# Patient Record
Sex: FEMALE | Race: WHITE | NOT HISPANIC OR LATINO | Employment: FULL TIME | URBAN - METROPOLITAN AREA
[De-identification: names, ages, dates, MRNs, and addresses within clinical notes are randomized per-mention and may not be internally consistent; named-entity substitution may affect disease eponyms.]

---

## 2017-04-11 ENCOUNTER — TRANSCRIBE ORDERS (OUTPATIENT)
Dept: ADMINISTRATIVE | Facility: HOSPITAL | Age: 57
End: 2017-04-11

## 2017-04-11 DIAGNOSIS — Z12.31 VISIT FOR SCREENING MAMMOGRAM: Primary | ICD-10-CM

## 2017-05-10 ENCOUNTER — HOSPITAL ENCOUNTER (OUTPATIENT)
Dept: MAMMOGRAPHY | Facility: HOSPITAL | Age: 57
Discharge: HOME | End: 2017-05-10
Attending: INTERNAL MEDICINE | Admitting: INTERNAL MEDICINE

## 2017-05-10 DIAGNOSIS — Z12.31 VISIT FOR SCREENING MAMMOGRAM: ICD-10-CM

## 2017-05-10 PROCEDURE — G0202 SCR MAMMO BI INCL CAD: HCPCS

## 2018-02-28 ENCOUNTER — TRANSCRIBE ORDERS (OUTPATIENT)
Dept: ADMINISTRATIVE | Facility: HOSPITAL | Age: 58
End: 2018-02-28

## 2018-02-28 DIAGNOSIS — Z12.31 VISIT FOR SCREENING MAMMOGRAM: Primary | ICD-10-CM

## 2018-03-13 ENCOUNTER — TRANSCRIBE ORDERS (OUTPATIENT)
Dept: ADMINISTRATIVE | Facility: HOSPITAL | Age: 58
End: 2018-03-13

## 2018-03-13 ENCOUNTER — TELEPHONE (OUTPATIENT)
Dept: GASTROENTEROLOGY | Facility: CLINIC | Age: 58
End: 2018-03-13

## 2018-03-13 DIAGNOSIS — Z78.0 POSTMENOPAUSAL: Primary | ICD-10-CM

## 2018-03-13 NOTE — TELEPHONE ENCOUNTER
----- Message from Darnell Westbrook sent at 3/13/2018  4:10 PM EDT -----  Regarding: PT CALLING   Contact: [phone]  PT CALLING ASKING WHEN SHE SHOULD SHE HAVE HER NEXT C/S ?

## 2018-03-13 NOTE — TELEPHONE ENCOUNTER
See note of 3/18/16 - repeat c/s in 5 yrs.      Call to pt to advise of above.  Verb understanding.

## 2018-05-09 ENCOUNTER — OFFICE VISIT (OUTPATIENT)
Dept: NEUROSURGERY | Facility: CLINIC | Age: 58
End: 2018-05-09

## 2018-05-09 VITALS
HEART RATE: 88 BPM | RESPIRATION RATE: 16 BRPM | HEIGHT: 66 IN | WEIGHT: 127 LBS | BODY MASS INDEX: 20.41 KG/M2 | SYSTOLIC BLOOD PRESSURE: 110 MMHG | DIASTOLIC BLOOD PRESSURE: 70 MMHG

## 2018-05-09 DIAGNOSIS — G89.29 CHRONIC SCAPULAR PAIN: ICD-10-CM

## 2018-05-09 DIAGNOSIS — G56.82 SUPRASCAPULAR ENTRAPMENT NEUROPATHY OF LEFT SIDE: ICD-10-CM

## 2018-05-09 DIAGNOSIS — M89.8X1 CHRONIC SCAPULAR PAIN: ICD-10-CM

## 2018-05-09 DIAGNOSIS — M54.2 NECK PAIN ON LEFT SIDE: Primary | ICD-10-CM

## 2018-05-09 PROCEDURE — 99214 OFFICE O/P EST MOD 30 MIN: CPT | Performed by: NURSE PRACTITIONER

## 2018-05-09 RX ORDER — CETIRIZINE HYDROCHLORIDE 10 MG/1
10 TABLET ORAL DAILY
COMMUNITY

## 2018-05-09 RX ORDER — ACETAMINOPHEN 500 MG
1000 TABLET ORAL AS NEEDED
COMMUNITY

## 2018-05-09 RX ORDER — CYCLOBENZAPRINE HCL 10 MG
10 TABLET ORAL 2 TIMES DAILY PRN
COMMUNITY
Start: 2018-04-17

## 2018-05-09 RX ORDER — PROMETHAZINE HYDROCHLORIDE 25 MG/1
25 TABLET ORAL AS NEEDED
COMMUNITY
End: 2018-09-11 | Stop reason: ALTCHOICE

## 2018-05-09 RX ORDER — HYDROCODONE BITARTRATE AND ACETAMINOPHEN 7.5; 325 MG/1; MG/1
1 TABLET ORAL EVERY 4 HOURS PRN
COMMUNITY

## 2018-05-09 NOTE — PROGRESS NOTES
Subjective   Patient ID: Terri Grant is a 58 y.o. female is here today for follow-up of ongoing suprascapular pain. She has been treating with pain management without relief and has not undergone additional imaging. She is unaccompanied.    History of Present Illness  Patient with history of left suprascapular and neck pain.  She was last seen in 2016 following a left suprascapular nerve injection.  At that time she had significant improvement in her symptoms.  She had an EMG that by report and Dr. Mcdonough office visit note from March 22, 2016 did not show significant difference in reduction in the suprascapular nerve bilaterally and no active denervation.  A second nerve block was recommended but the patient states that her the pain management providers did not feel it was necessary at that point.  He she did well until about 6 months ago when she started having intermittent flareups of pain in the left neck and suprascapular region.  It became significantly worse about 6 weeks ago when she had a severe flareup the pain is sharp stabbing begins in the left lateral neck and radiates to the top of the medial left scapular region.  She has been through physical therapy, acupuncture, pain patches, gabapentin, Lyrica in the past.  She had side effects with medications.  She finds that the only thing that gives her benefit is pressure on the area.  Her pain was severe to the point that she was using narcotic medications, but she has started using oral him to extract this found significant improvement with that.  She is no longer using narcotics.  She denies any associated weakness, numbness, tingling, pain in the arms.  Movement of her neck aggravates the scapular pain as well as the neck pain.    The following portions of the patient's history were reviewed and updated as appropriate: allergies, current medications and problem list.    Review of Systems   Musculoskeletal: Positive for neck pain (L) suprascapular  pain. Negative for gait problem.   Neurological: Positive for weakness (left arm) and numbness (if sleeping on (L) side at night).       Objective   Physical Exam   Constitutional: She is oriented to person, place, and time. She appears well-developed and well-nourished.   Pulmonary/Chest: Effort normal.   Musculoskeletal:        Cervical back: She exhibits tenderness (left upper trapezius, left neck) and pain (into neck and left scapula with neck ROM). She exhibits normal range of motion.        Thoracic back: She exhibits tenderness (left suprascapular region). She exhibits no deformity (no scapular winging).   Neurological: She is alert and oriented to person, place, and time. She has normal strength. Gait normal.   Reflex Scores:       Tricep reflexes are 2+ on the right side and 2+ on the left side.       Bicep reflexes are 2+ on the right side and 2+ on the left side.       Brachioradialis reflexes are 2+ on the right side and 2+ on the left side.       Patellar reflexes are 2+ on the right side and 2+ on the left side.       Achilles reflexes are 2+ on the right side and 2+ on the left side.  Skin: Skin is warm and dry.   Psychiatric: Her speech is normal. Judgment normal. Her mood appears anxious. She is hyperactive. Cognition and memory are normal.   Pain responses She is attentive.   Vitals reviewed.    Neurologic Exam     Mental Status   Oriented to person, place, and time.   Speech: speech is normal   Level of consciousness: alert  Knowledge: good.     Motor Exam   Muscle bulk: normal  Right arm tone: normal  Left arm tone: normal    Strength   Strength 5/5 throughout.     Sensory Exam   Right arm light touch: normal  Left arm light touch: normal  Right arm vibration: normal  Left arm vibration: normal  Temperature intact to cold     Gait, Coordination, and Reflexes     Gait  Gait: normal    Reflexes   Right brachioradialis: 2+  Left brachioradialis: 2+  Right biceps: 2+  Left biceps: 2+  Right triceps:  2+  Left triceps: 2+  Right patellar: 2+  Left patellar: 2+  Right achilles: 2+  Left achilles: 2+  Right Verduzco: absent  Left Verduzco: absent      Assessment/Plan   Independent Review of Radiographic Studies:    No new imaging    Medical Decision Making:    Patient presents for return of left suprascapular pain.  She had great resolution with a left super scapular nerve injection approximately 2-1/2 years ago.  Her pain began again about 6 months ago and has become progressively worse particularly in the last 6 weeks.  She denies any radicular features in the arms.  She has significant trouble getting comfortable.  She states that if she could keep pressure on the area at all time she would be fine, but obviously that is impossible.    Her exam is as noted above with no neurologic red flags.  She is exquisitely tender in the left suprascapular region but also states that the pain improves with the pressure that I place there.  She has normal strength and sensation and reflexes in her upper extremities.  Her neck range of motion may be slightly reduced but it does increase her neck and scapular pain.     She will likely benefit from a left suprascapular nerve injection, but due to the resolution of her pain and recurrence, I recommend that we reimage first to ensure she doesn't have any new spine abnormalities.  I would like to also look at her thoracic spine to ensure that we didn't overlook something previously that might be present.  At her next visit, we will consider sending her for a left suprascapular nerve injection.  If she gets good relief with that, she may benefit from surgical neuro lysis of the left suprascapular nerve.    Plan: MRI cervical and thoracic spine and return to office following.  Terri was seen today for other.    Diagnoses and all orders for this visit:    Neck pain on left side  -     MRI Cervical Spine Without Contrast; Future    Chronic scapular pain  -     MRI Cervical Spine  Without Contrast; Future  -     MRI Thoracic Spine Without Contrast; Future    Suprascapular entrapment neuropathy of left side      Return for with imaging, Follow-up with NP.

## 2018-05-11 ENCOUNTER — HOSPITAL ENCOUNTER (OUTPATIENT)
Dept: MAMMOGRAPHY | Facility: HOSPITAL | Age: 58
Discharge: HOME | End: 2018-05-11
Attending: INTERNAL MEDICINE | Admitting: INTERNAL MEDICINE

## 2018-05-11 ENCOUNTER — HOSPITAL ENCOUNTER (OUTPATIENT)
Dept: BONE DENSITY | Facility: HOSPITAL | Age: 58
Discharge: HOME | End: 2018-05-11
Attending: INTERNAL MEDICINE

## 2018-05-11 DIAGNOSIS — Z12.31 VISIT FOR SCREENING MAMMOGRAM: ICD-10-CM

## 2018-05-11 DIAGNOSIS — Z78.0 POSTMENOPAUSAL: ICD-10-CM

## 2018-05-11 PROCEDURE — 77067 SCR MAMMO BI INCL CAD: CPT

## 2018-05-11 PROCEDURE — 77080 DXA BONE DENSITY AXIAL: CPT

## 2018-05-15 ENCOUNTER — TELEPHONE (OUTPATIENT)
Dept: NEUROSURGERY | Facility: CLINIC | Age: 58
End: 2018-05-15

## 2018-05-15 NOTE — TELEPHONE ENCOUNTER
Patient last seen on 5/29 by  for chronic scapular pain. Denise sappin for patient to have 2 MRIs.    Patient wants Denise to know that she cannot afford to have her MRIs done at [facility]. She is going to call [facility] and see how much they cost there. She said that she cannot afford to do 2 MRIs and doesn't know why she needs 2. She will call us back and let us know if she is going to do her MRIs or if I need to cancel her appt in June with Maday.    Please advise

## 2018-05-15 NOTE — TELEPHONE ENCOUNTER
Discussed with Dr. Mcdonough. He said we can try ordering cervical MRI to include through [facility] as that will show all that we need but I'm not sure if the imaging center will agree to that. Sometimes they will make us order both.

## 2018-05-16 NOTE — TELEPHONE ENCOUNTER
Pt called to request orders faxed to [facility] so they will schedule her.  I contacted [facility] and transferred the authorization to that facility and sent to [facility] as well.  Pt will need to cancel her appointments with [facility] once [facility] schedules her and let us know when her appt with O is.

## 2018-05-22 NOTE — TELEPHONE ENCOUNTER
Called [facility] and they said that they have called her and left a message for her to call back and  Get scheduled

## 2018-06-27 ENCOUNTER — OFFICE VISIT (OUTPATIENT)
Dept: NEUROSURGERY | Facility: CLINIC | Age: 58
End: 2018-06-27

## 2018-06-27 VITALS
HEIGHT: 66 IN | SYSTOLIC BLOOD PRESSURE: 108 MMHG | RESPIRATION RATE: 18 BRPM | WEIGHT: 127 LBS | BODY MASS INDEX: 20.41 KG/M2 | HEART RATE: 79 BPM | DIASTOLIC BLOOD PRESSURE: 68 MMHG

## 2018-06-27 DIAGNOSIS — M54.2 NECK PAIN ON LEFT SIDE: Primary | ICD-10-CM

## 2018-06-27 DIAGNOSIS — G56.82 SUPRASCAPULAR ENTRAPMENT NEUROPATHY OF LEFT SIDE: ICD-10-CM

## 2018-06-27 PROCEDURE — 99213 OFFICE O/P EST LOW 20 MIN: CPT | Performed by: NURSE PRACTITIONER

## 2018-06-27 RX ORDER — IBUPROFEN 200 MG
200 TABLET ORAL EVERY 6 HOURS PRN
COMMUNITY
End: 2019-05-24

## 2018-06-27 NOTE — PROGRESS NOTES
"Subjective   Patient ID: Terri Grant is a 58 y.o. female is here today for follow-up scapular pain. Patient presents unaccompanied.     History of Present Illness     She returns to the office today for follow-up of suprascapular pain.  She's been treated at pain management without relief.  She did undergo prior left suprascapular nerve injection in 2016 and reported significant improvement in her symptoms.  She had a flareup about 6 weeks ago in the left neck and suprascapular area.  She has tried physical therapy, acupuncture pain patches and other medications which have provided no relief.  She has undergone thoracic and cervical MRI imaging for further evaluation.    Today, she reports constant, ongoing left suprascapular pain.  The only way she gets relief is with constant pressure of the left suprascapular nerve.  She uses a tennis ball when she is driving, a rolling ball massage or when she is at home and also gets her  to apply deep pressure at the specific area with his elbow.  She has a follow-up appointment with pain management on July 12 and is hopeful for another injection.  She is interested in more permanent lysis of the nerve and is hopeful for pain relief with the injection.  She denies any new problems.  She also has ongoing left-sided neck pain.  There is no radiation into the left arm.    She presents unaccompanied.      /68 (BP Location: Left arm, Patient Position: Sitting)   Pulse 79   Resp 18   Ht 166.4 cm (65.5\")   Wt 57.6 kg (127 lb)   LMP  (LMP Unknown)   BMI 20.81 kg/m²     The following portions of the patient's history were reviewed and updated as appropriate: allergies, current medications, past family history, past medical history, past social history, past surgical history and problem list.    Review of Systems   Genitourinary: Negative for difficulty urinating and enuresis.   Musculoskeletal: Positive for neck stiffness (L side). Negative for neck pain.     "    Scapular pain, denies arm pain   Neurological: Positive for numbness (occ'l L side if sleeps on it). Negative for weakness.   Psychiatric/Behavioral: Positive for sleep disturbance.       Objective   Physical Exam   Constitutional: She is oriented to person, place, and time. She appears well-developed and well-nourished. She is cooperative.   HENT:   Head: Atraumatic.   Neck: Neck supple. No tracheal deviation present.   Pulmonary/Chest: Effort normal and breath sounds normal.   Abdominal: Soft.   Musculoskeletal: Normal range of motion. She exhibits tenderness (very tender to pinpoint palpation at the left suprascapular notch.). She exhibits no deformity.   Neurological: She is alert and oriented to person, place, and time. She displays no tremor and normal reflexes. No cranial nerve deficit. Coordination and gait normal. GCS eye subscore is 4. GCS verbal subscore is 5. GCS motor subscore is 6.   Skin: Skin is warm and dry. No pallor.   Psychiatric: She has a normal mood and affect. Her behavior is normal.   Vitals reviewed.    Neurologic Exam     Mental Status   Oriented to person, place, and time.       Assessment/Plan   Independent Review of Radiographic Studies:    Thoracic MRI imaging reveals mild spondylolisthesis and multilevel small disc bulges, no acute abnormalities noted.  Cervical MRI imaging also shows stable findings with very small disc bulges and mild spondylosis.        Medical Decision Making:    She returns the office today for ongoing follow-up with history of left suprascapular nerve impingement and pain.  Exam as noted above, no red flags.  She continues with almost constant pain at this area and get relief from her discomfort by applying firm pressure to the area.  She is pending an additional injection with pain management and a couple weeks.  I will talk to Dr. Mcdonough about more permanent nerve lysis/ablation at this area in hopes of giving her more long lasting relief.  She will be  contacted with this information.    Plan: Injection as scheduled with pain management, patient will be contacted with Dr. Mcdonough recommendation.      Addendum: Discussed with Dr. Mcdonough and he states that he would be happy to see the patient for further discussion of possible left suprascapular nerve root entrapment.  Patient contacted, she declines to pursue any surgical treatment at this time.  We will see her back on an as-needed basis going forward.    Terri was seen today for scapular pain.    Diagnoses and all orders for this visit:    Neck pain on left side    Suprascapular entrapment neuropathy of left side      Return if symptoms worsen or fail to improve.

## 2018-07-06 ENCOUNTER — TELEPHONE (OUTPATIENT)
Dept: NEUROSURGERY | Facility: CLINIC | Age: 58
End: 2018-07-06

## 2018-07-06 NOTE — TELEPHONE ENCOUNTER
Patient last seen on 6/27 by Maday for scapular pain. Patient called and said that someone called her while she was at the hospital with her mother. She was not sure what it was about. Went over Maday's note and read where it said that Maday would talk to Dr Mcdonough about doing nerve lysis/ablation.  Pt said that she does not want to do an ablation.

## 2018-09-11 ENCOUNTER — OFFICE VISIT (OUTPATIENT)
Dept: NEUROSURGERY | Facility: CLINIC | Age: 58
End: 2018-09-11

## 2018-09-11 VITALS
WEIGHT: 121 LBS | BODY MASS INDEX: 19.83 KG/M2 | RESPIRATION RATE: 16 BRPM | HEART RATE: 88 BPM | SYSTOLIC BLOOD PRESSURE: 130 MMHG | DIASTOLIC BLOOD PRESSURE: 74 MMHG

## 2018-09-11 DIAGNOSIS — G56.82 SUPRASCAPULAR ENTRAPMENT NEUROPATHY OF LEFT SIDE: Primary | ICD-10-CM

## 2018-09-11 PROCEDURE — 99213 OFFICE O/P EST LOW 20 MIN: CPT | Performed by: NEUROLOGICAL SURGERY

## 2018-09-11 NOTE — PROGRESS NOTES
Subjective   Patient ID: Terri Grant is a 58 y.o. female is here today for follow-up of left scapular pain. She is unaccompanied.    History of Present Illness  Patient has history of intermittent left suprascapular pain. It is now constant and improved only with direct pressure. She had only 4 days of relief with left suprascapular pain following most recent injection 8/13/18. She has daily suprascapular pain. It interferes with ADLs. She has tried OTC patches, creams, CBD oil, massage (electronic and ball), flexeril, pain medication, PT, acupuncture, heat, and ice.    The following portions of the patient's history were reviewed and updated as appropriate: allergies, current medications and problem list.    Review of Systems   Musculoskeletal: Positive for arthralgias (left scapular region) and neck pain. Negative for back pain and gait problem.   Neurological: Negative for weakness.       Objective   Physical Exam   Constitutional: She is oriented to person, place, and time. She appears well-developed and well-nourished.   Pulmonary/Chest: Effort normal.   Musculoskeletal:        Cervical back: She exhibits tenderness (left upper trapezius, left neck) and pain (into neck and left scapula with neck ROM). She exhibits normal range of motion.        Thoracic back: She exhibits tenderness (left suprascapular region). She exhibits no deformity (no scapular winging).   Neurological: She is alert and oriented to person, place, and time. She has normal strength. Gait normal.   Reflex Scores:       Tricep reflexes are 2+ on the right side and 2+ on the left side.       Bicep reflexes are 2+ on the right side and 2+ on the left side.       Brachioradialis reflexes are 2+ on the right side and 2+ on the left side.  Skin: Skin is warm and dry.   Psychiatric: Her speech is normal and behavior is normal. Judgment normal. Her mood appears anxious (mildly). Cognition and memory are normal.   Pain responses She is  attentive.   Vitals reviewed.    Neurologic Exam     Mental Status   Oriented to person, place, and time.   Speech: speech is normal   Level of consciousness: alert  Knowledge: good.   Normal comprehension.     Motor Exam   Muscle bulk: normal    Strength   Strength 5/5 throughout.     Sensory Exam   Right arm light touch: normal  Left arm light touch: normal    Gait, Coordination, and Reflexes     Gait  Gait: normal    Reflexes   Right brachioradialis: 2+  Left brachioradialis: 2+  Right biceps: 2+  Left biceps: 2+  Right triceps: 2+  Left triceps: 2+  Right Verduzco: absent  Left Verduzco: absent      Assessment/Plan   Independent Review of Radiographic Studies:      MRI cervical personally reviewed : No change no appearance. No neural compression.    Medical Decision Making:      I confirmed and obtained the above history as recorded by the nurse practitioner acting as a scribe. I performed the above examination and it is documented by the nurse practitioner acting as a scribe.    Patient presents with symptoms consistent with suprascapular nerve compression on the left side.    Treatment options include continued conservative treatment or decompression of the suprascapular nerve.    I explained the risks benefits and alternatives of suprascapular nerve decompression which include paralysis of the arm, chronic pain in the area of the incision, damage to the suprascapular nerve that could end up in a winging scapula, lack of improvement.  I explained to the patient that the most common complication would be lack of improvement rather than these other much more rare and substantially worse complications.    I encouraged the patient to consider referral to a major Medical Center.    We will try to get her referred to a major Medical Center.    Terri was seen today for left scapular pain.    Diagnoses and all orders for this visit:    Suprascapular entrapment neuropathy of left side      Return if symptoms worsen  or fail to improve.

## 2018-10-03 ENCOUNTER — TELEPHONE (OUTPATIENT)
Dept: NEUROSURGERY | Facility: CLINIC | Age: 58
End: 2018-10-03

## 2018-10-03 NOTE — TELEPHONE ENCOUNTER
"Dr. Mcdonough was to investigate the name of MD at \"[facility]\" who treats scapular entrapment.     I have not yet received this information from him; will discuss again when I see him tomorrow. Patient updated.  "

## 2018-10-04 NOTE — TELEPHONE ENCOUNTER
Per MARIBELL: refer her to dept chair, Dr. Shaka Burkett at [facility] for consideration of scapular nerve decompression. ([phone])    Referral form along with last two office notes, old/new MRI cervical reports and insurance card has been faxed ([phone]).    Left detailed VM informing patient of this.

## 2018-10-05 NOTE — TELEPHONE ENCOUNTER
Patient informed. We received confirmation of receipt of records, [facility] # -[phone]. They have requested imaging be mailed to them.    Sent CD w/MRI cerv/thor 6-2-18, MRI cerv 4-9-14, 3-3-14  [address]  [address]  Attn: Neurosurgery Appt Office

## 2019-01-31 ENCOUNTER — HOSPITAL ENCOUNTER (OUTPATIENT)
Dept: ORTHOPEDIC SURGERY | Facility: CLINIC | Age: 59
Discharge: HOME | End: 2019-01-31
Attending: NEUROLOGICAL SURGERY | Admitting: NEUROLOGICAL SURGERY

## 2019-02-01 ENCOUNTER — TELEPHONE (OUTPATIENT)
Dept: NEUROSURGERY | Facility: CLINIC | Age: 59
End: 2019-02-01

## 2019-02-01 NOTE — TELEPHONE ENCOUNTER
Pt was lst seen on 9/11 with MARIBELL for left scapular pain.    Pt calls today wanting to inform [facility] that she has seen Dr. Ziegler and she understands that they have spoken but she may want to stay with him due to his location. Dr. Ziegler is wanting to order some more tests and the pt wants to know what he thinks of this.    [phone]

## 2019-02-01 NOTE — TELEPHONE ENCOUNTER
It is always a good thing to go with any testing that the treating physician recommends. She should do what Dr. Ziegler has recommended since he is now treating her scapular entrapment pain, not Dr. Mcdonough. M explaining this.

## 2019-02-16 ENCOUNTER — HOSPITAL ENCOUNTER (OUTPATIENT)
Dept: OTHER | Facility: HOSPITAL | Age: 59
Discharge: HOME | End: 2019-02-16
Attending: NEUROLOGICAL SURGERY | Admitting: NEUROLOGICAL SURGERY

## 2019-02-28 ENCOUNTER — HOSPITAL ENCOUNTER (OUTPATIENT)
Dept: ORTHOPEDIC SURGERY | Facility: CLINIC | Age: 59
Discharge: HOME | End: 2019-02-28
Attending: ORTHOPAEDIC SURGERY | Admitting: ORTHOPAEDIC SURGERY

## 2019-03-21 ENCOUNTER — TRANSCRIBE ORDERS (OUTPATIENT)
Dept: ADMINISTRATIVE | Facility: HOSPITAL | Age: 59
End: 2019-03-21

## 2019-03-21 DIAGNOSIS — Z12.31 VISIT FOR SCREENING MAMMOGRAM: Primary | ICD-10-CM

## 2019-05-24 ENCOUNTER — APPOINTMENT (OUTPATIENT)
Dept: PREADMISSION TESTING | Facility: HOSPITAL | Age: 59
End: 2019-05-24

## 2019-05-24 ENCOUNTER — HOSPITAL ENCOUNTER (OUTPATIENT)
Dept: GENERAL RADIOLOGY | Facility: HOSPITAL | Age: 59
Discharge: HOME | End: 2019-05-24
Admitting: NEUROLOGICAL SURGERY

## 2019-05-24 VITALS
WEIGHT: 120 LBS | SYSTOLIC BLOOD PRESSURE: 138 MMHG | BODY MASS INDEX: 19.29 KG/M2 | HEIGHT: 66 IN | TEMPERATURE: 98.2 F | HEART RATE: 79 BPM | RESPIRATION RATE: 20 BRPM | DIASTOLIC BLOOD PRESSURE: 85 MMHG | OXYGEN SATURATION: 100 %

## 2019-05-24 LAB
ABO GROUP BLD: NORMAL
ANION GAP SERPL CALCULATED.3IONS-SCNC: 12.1 MMOL/L
APTT PPP: 28.9 SECONDS (ref 22.7–35.4)
BACTERIA UR QL AUTO: NORMAL /HPF
BASOPHILS # BLD AUTO: 0.05 10*3/MM3 (ref 0–0.2)
BASOPHILS NFR BLD AUTO: 0.5 % (ref 0–1.5)
BILIRUB UR QL STRIP: NEGATIVE
BLD GP AB SCN SERPL QL: NEGATIVE
BUN BLD-MCNC: 15 MG/DL (ref 6–20)
BUN/CREAT SERPL: 21.1 (ref 7–25)
CALCIUM SPEC-SCNC: 9.2 MG/DL (ref 8.6–10.5)
CHLORIDE SERPL-SCNC: 101 MMOL/L (ref 98–107)
CLARITY UR: CLEAR
CO2 SERPL-SCNC: 24.9 MMOL/L (ref 22–29)
COLOR UR: YELLOW
CREAT BLD-MCNC: 0.71 MG/DL (ref 0.57–1)
DEPRECATED RDW RBC AUTO: 40.3 FL (ref 37–54)
EOSINOPHIL # BLD AUTO: 0.13 10*3/MM3 (ref 0–0.4)
EOSINOPHIL NFR BLD AUTO: 1.4 % (ref 0.3–6.2)
ERYTHROCYTE [DISTWIDTH] IN BLOOD BY AUTOMATED COUNT: 11.8 % (ref 12.3–15.4)
GFR SERPL CREATININE-BSD FRML MDRD: 84 ML/MIN/1.73
GLUCOSE BLD-MCNC: 108 MG/DL (ref 65–99)
GLUCOSE UR STRIP-MCNC: NEGATIVE MG/DL
HCT VFR BLD AUTO: 40.3 % (ref 34–46.6)
HGB BLD-MCNC: 13.1 G/DL (ref 12–15.9)
HGB UR QL STRIP.AUTO: NEGATIVE
HYALINE CASTS UR QL AUTO: NORMAL /LPF
IMM GRANULOCYTES # BLD AUTO: 0.04 10*3/MM3 (ref 0–0.05)
IMM GRANULOCYTES NFR BLD AUTO: 0.4 % (ref 0–0.5)
INR PPP: 1.06 (ref 0.9–1.1)
KETONES UR QL STRIP: NEGATIVE
LEUKOCYTE ESTERASE UR QL STRIP.AUTO: ABNORMAL
LYMPHOCYTES # BLD AUTO: 1.92 10*3/MM3 (ref 0.7–3.1)
LYMPHOCYTES NFR BLD AUTO: 21 % (ref 19.6–45.3)
MCH RBC QN AUTO: 30.2 PG (ref 26.6–33)
MCHC RBC AUTO-ENTMCNC: 32.5 G/DL (ref 31.5–35.7)
MCV RBC AUTO: 92.9 FL (ref 79–97)
MONOCYTES # BLD AUTO: 0.43 10*3/MM3 (ref 0.1–0.9)
MONOCYTES NFR BLD AUTO: 4.7 % (ref 5–12)
NEUTROPHILS # BLD AUTO: 6.58 10*3/MM3 (ref 1.7–7)
NEUTROPHILS NFR BLD AUTO: 72 % (ref 42.7–76)
NITRITE UR QL STRIP: NEGATIVE
NRBC BLD AUTO-RTO: 0 /100 WBC (ref 0–0.2)
PH UR STRIP.AUTO: 6.5 [PH] (ref 5–8)
PLATELET # BLD AUTO: 207 10*3/MM3 (ref 140–450)
PMV BLD AUTO: 10.7 FL (ref 6–12)
POTASSIUM BLD-SCNC: 4.2 MMOL/L (ref 3.5–5.2)
PROT UR QL STRIP: NEGATIVE
PROTHROMBIN TIME: 13.5 SECONDS (ref 11.7–14.2)
RBC # BLD AUTO: 4.34 10*6/MM3 (ref 3.77–5.28)
RBC # UR: NORMAL /HPF
REF LAB TEST METHOD: NORMAL
RH BLD: POSITIVE
SODIUM BLD-SCNC: 138 MMOL/L (ref 136–145)
SP GR UR STRIP: 1.01 (ref 1–1.03)
SQUAMOUS #/AREA URNS HPF: NORMAL /HPF
T&S EXPIRATION DATE: NORMAL
UROBILINOGEN UR QL STRIP: ABNORMAL
WBC NRBC COR # BLD: 9.15 10*3/MM3 (ref 3.4–10.8)
WBC UR QL AUTO: NORMAL /HPF

## 2019-05-24 PROCEDURE — 71046 X-RAY EXAM CHEST 2 VIEWS: CPT

## 2019-05-24 PROCEDURE — 86850 RBC ANTIBODY SCREEN: CPT | Performed by: NEUROLOGICAL SURGERY

## 2019-05-24 PROCEDURE — 93005 ELECTROCARDIOGRAM TRACING: CPT

## 2019-05-24 PROCEDURE — 93010 ELECTROCARDIOGRAM REPORT: CPT | Performed by: INTERNAL MEDICINE

## 2019-05-24 PROCEDURE — 85730 THROMBOPLASTIN TIME PARTIAL: CPT | Performed by: NEUROLOGICAL SURGERY

## 2019-05-24 PROCEDURE — 81001 URINALYSIS AUTO W/SCOPE: CPT | Performed by: NEUROLOGICAL SURGERY

## 2019-05-24 PROCEDURE — 36415 COLL VENOUS BLD VENIPUNCTURE: CPT

## 2019-05-24 PROCEDURE — 85610 PROTHROMBIN TIME: CPT | Performed by: NEUROLOGICAL SURGERY

## 2019-05-24 PROCEDURE — 86900 BLOOD TYPING SEROLOGIC ABO: CPT | Performed by: NEUROLOGICAL SURGERY

## 2019-05-24 PROCEDURE — 86901 BLOOD TYPING SEROLOGIC RH(D): CPT | Performed by: NEUROLOGICAL SURGERY

## 2019-05-24 PROCEDURE — 85025 COMPLETE CBC W/AUTO DIFF WBC: CPT | Performed by: NEUROLOGICAL SURGERY

## 2019-05-24 PROCEDURE — 80048 BASIC METABOLIC PNL TOTAL CA: CPT | Performed by: NEUROLOGICAL SURGERY

## 2019-05-24 RX ORDER — PROMETHAZINE HYDROCHLORIDE 25 MG/1
25 TABLET ORAL EVERY 6 HOURS PRN
COMMUNITY

## 2019-05-24 RX ORDER — MV-MIN/VIT C/GLUT/LYSINE/HB124 250-12.5MG
1 TABLET,CHEWABLE ORAL DAILY
COMMUNITY

## 2019-05-24 NOTE — DISCHARGE INSTRUCTIONS
Take the following medications the morning of surgery with a small sip of water:    none    General Instructions:  • Do not eat solid food after midnight the night before surgery.  • You may drink clear liquids day of surgery but must stop at least one hour before your hospital arrival time.  • It is beneficial for you to have a clear drink that contains carbohydrates the day of surgery.  We suggest a 12 to 20 ounce bottle of Gatorade or Powerade for non-diabetic patients or a 12 to 20 ounce bottle of G2 or Powerade Zero for diabetic patients. (Pediatric patients, are not advised to drink a 12 to 20 ounce carbohydrate drink)    Clear liquids are liquids you can see through.  Nothing red in color.     Plain water                               Sports drinks  Sodas                                   Gelatin (Jell-O)  Fruit juices without pulp such as white grape juice and apple juice  Popsicles that contain no fruit or yogurt  Tea or coffee (no cream or milk added)  Gatorade / Powerade  G2 / Powerade Zero    • Infants may have breast milk up to four hours before surgery.  • Infants drinking formula may drink formula up to six hours before surgery.   • Patients who avoid smoking, chewing tobacco and alcohol for 4 weeks prior to surgery have a reduced risk of post-operative complications.  Quit smoking as many days before surgery as you can.  • Do not smoke, use chewing tobacco or drink alcohol the day of surgery.   • If applicable bring your C-PAP/ BI-PAP machine.  • Bring any papers given to you in the doctor’s office.  • Wear clean comfortable clothes and socks.  • Do not wear contact lenses, false eyelashes or make-up.  Bring a case for your glasses.   • Bring crutches or walker if applicable.  • Remove all piercings.  Leave jewelry and any other valuables at home.  • Hair extensions with metal clips must be removed prior to surgery.  • The Pre-Admission Testing nurse will instruct you to bring medications if unable  to obtain an accurate list in Pre-Admission Testing.        If you were given a blood bank ID arm band remember to bring it with you the day of surgery.    Preventing a Surgical Site Infection:  • For 2 to 3 days before surgery, avoid shaving with a razor because the razor can irritate skin and make it easier to develop an infection.    • Any areas of open skin can increase the risk of a post-operative wound infection by allowing bacteria to enter and travel throughout the body.  Notify your surgeon if you have any skin wounds / rashes even if it is not near the expected surgical site.  The area will need assessed to determine if surgery should be delayed until it is healed.  • The night prior to surgery sleep in a clean bed with clean clothing.  Do not allow pets to sleep with you.  • Shower on the morning of surgery using a fresh bar of anti-bacterial soap (such as Dial) and clean washcloth.  Dry with a clean towel and dress in clean clothing.  • Ask your surgeon if you will be receiving antibiotics prior to surgery.  • Make sure you, your family, and all healthcare providers clean their hands with soap and water or an alcohol based hand  before caring for you or your wound.    Day of surgery:5/29/19   0700  Upon arrival, a Pre-op nurse and Anesthesiologist will review your health history, obtain vital signs, and answer questions you may have.  The only belongings needed at this time will be a list of your home medications and if applicable your C-PAP/BI-PAP machine.  If you are staying overnight your family can leave the rest of your belongings in the car and bring them to your room later.  A Pre-op nurse will start an IV and you may receive medication in preparation for surgery, including something to help you relax.  Your family will be able to see you in the Pre-op area.  While you are in surgery your family should notify the waiting room  if they leave the waiting room area and provide a  contact phone number.    Please be aware that surgery does come with discomfort.  We want to make every effort to control your discomfort so please discuss any uncontrolled symptoms with your nurse.   Your doctor will most likely have prescribed pain medications.      If you are going home after surgery you will receive individualized written care instructions before being discharged.  A responsible adult must drive you to and from the hospital on the day of your surgery and stay with you for 24 hours.    If you are staying overnight following surgery, you will be transported to your hospital room following the recovery period.  [facility] has all private rooms.    You have received a list of surgical assistants for your reference.  If you have any questions please call Pre-Admission Testing at [phone].  Deductibles and co-payments are collected on the day of service. Please be prepared to pay the required co-pay, deductible or deposit on the day of service as defined by your plan.

## 2019-05-29 ENCOUNTER — ANESTHESIA EVENT (OUTPATIENT)
Dept: PERIOP | Facility: HOSPITAL | Age: 59
End: 2019-05-29

## 2019-05-29 ENCOUNTER — ANESTHESIA (OUTPATIENT)
Dept: PERIOP | Facility: HOSPITAL | Age: 59
End: 2019-05-29

## 2019-05-29 ENCOUNTER — HOSPITAL ENCOUNTER (OUTPATIENT)
Facility: HOSPITAL | Age: 59
Setting detail: HOSPITAL OUTPATIENT SURGERY
Discharge: HOME | End: 2019-05-29
Attending: NEUROLOGICAL SURGERY | Admitting: NEUROLOGICAL SURGERY

## 2019-05-29 VITALS
TEMPERATURE: 97.5 F | SYSTOLIC BLOOD PRESSURE: 135 MMHG | BODY MASS INDEX: 20.2 KG/M2 | OXYGEN SATURATION: 98 % | WEIGHT: 121.25 LBS | HEIGHT: 65 IN | HEART RATE: 83 BPM | RESPIRATION RATE: 16 BRPM | DIASTOLIC BLOOD PRESSURE: 79 MMHG

## 2019-05-29 PROCEDURE — 25010000002 PHENYLEPHRINE PER 1 ML: Performed by: NURSE ANESTHETIST, CERTIFIED REGISTERED

## 2019-05-29 PROCEDURE — 25010000002 DEXAMETHASONE PER 1 MG: Performed by: NURSE ANESTHETIST, CERTIFIED REGISTERED

## 2019-05-29 PROCEDURE — 25010000002 METHYLPREDNISOLONE PER 80 MG: Performed by: NEUROLOGICAL SURGERY

## 2019-05-29 PROCEDURE — 25010000003 CEFAZOLIN 1-4 GM/50ML-% SOLUTION: Performed by: NEUROLOGICAL SURGERY

## 2019-05-29 PROCEDURE — 25010000002 PROPOFOL 10 MG/ML EMULSION: Performed by: NURSE ANESTHETIST, CERTIFIED REGISTERED

## 2019-05-29 PROCEDURE — 25010000002 ONDANSETRON PER 1 MG: Performed by: NURSE ANESTHETIST, CERTIFIED REGISTERED

## 2019-05-29 PROCEDURE — 25010000002 MIDAZOLAM PER 1 MG: Performed by: ANESTHESIOLOGY

## 2019-05-29 PROCEDURE — 25010000002 FENTANYL CITRATE (PF) 100 MCG/2ML SOLUTION: Performed by: NURSE ANESTHETIST, CERTIFIED REGISTERED

## 2019-05-29 PROCEDURE — 25010000002 NEOSTIGMINE PER 0.5 MG: Performed by: NURSE ANESTHETIST, CERTIFIED REGISTERED

## 2019-05-29 PROCEDURE — 25010000002 HYDROMORPHONE PER 4 MG: Performed by: NURSE ANESTHETIST, CERTIFIED REGISTERED

## 2019-05-29 PROCEDURE — 64708 REVISE ARM/LEG NERVE: CPT | Performed by: NEUROLOGICAL SURGERY

## 2019-05-29 RX ORDER — FLUMAZENIL 0.1 MG/ML
0.2 INJECTION INTRAVENOUS AS NEEDED
Status: DISCONTINUED | OUTPATIENT
Start: 2019-05-29 | End: 2019-05-29 | Stop reason: HOSPADM

## 2019-05-29 RX ORDER — CEFAZOLIN SODIUM 1 G/50ML
1 INJECTION, SOLUTION INTRAVENOUS ONCE
Status: COMPLETED | OUTPATIENT
Start: 2019-05-29 | End: 2019-05-29

## 2019-05-29 RX ORDER — HYDROMORPHONE HYDROCHLORIDE 1 MG/ML
0.5 INJECTION, SOLUTION INTRAMUSCULAR; INTRAVENOUS; SUBCUTANEOUS
Status: DISCONTINUED | OUTPATIENT
Start: 2019-05-29 | End: 2019-05-29 | Stop reason: HOSPADM

## 2019-05-29 RX ORDER — PROPARACAINE HYDROCHLORIDE 5 MG/ML
1 SOLUTION/ DROPS OPHTHALMIC ONCE
Status: COMPLETED | OUTPATIENT
Start: 2019-05-29 | End: 2019-05-29

## 2019-05-29 RX ORDER — HYDRALAZINE HYDROCHLORIDE 20 MG/ML
5 INJECTION INTRAMUSCULAR; INTRAVENOUS
Status: DISCONTINUED | OUTPATIENT
Start: 2019-05-29 | End: 2019-05-29 | Stop reason: HOSPADM

## 2019-05-29 RX ORDER — EPHEDRINE SULFATE 50 MG/ML
5 INJECTION, SOLUTION INTRAVENOUS ONCE AS NEEDED
Status: DISCONTINUED | OUTPATIENT
Start: 2019-05-29 | End: 2019-05-29 | Stop reason: HOSPADM

## 2019-05-29 RX ORDER — MIDAZOLAM HYDROCHLORIDE 1 MG/ML
1 INJECTION INTRAMUSCULAR; INTRAVENOUS
Status: DISCONTINUED | OUTPATIENT
Start: 2019-05-29 | End: 2019-05-29 | Stop reason: HOSPADM

## 2019-05-29 RX ORDER — PROMETHAZINE HYDROCHLORIDE 25 MG/ML
12.5 INJECTION, SOLUTION INTRAMUSCULAR; INTRAVENOUS ONCE AS NEEDED
Status: DISCONTINUED | OUTPATIENT
Start: 2019-05-29 | End: 2019-05-29 | Stop reason: HOSPADM

## 2019-05-29 RX ORDER — PROMETHAZINE HYDROCHLORIDE 25 MG/ML
6.25 INJECTION, SOLUTION INTRAMUSCULAR; INTRAVENOUS
Status: DISCONTINUED | OUTPATIENT
Start: 2019-05-29 | End: 2019-05-29 | Stop reason: HOSPADM

## 2019-05-29 RX ORDER — PROMETHAZINE HYDROCHLORIDE 25 MG/1
25 SUPPOSITORY RECTAL ONCE AS NEEDED
Status: DISCONTINUED | OUTPATIENT
Start: 2019-05-29 | End: 2019-05-29 | Stop reason: HOSPADM

## 2019-05-29 RX ORDER — HYDROCODONE BITARTRATE AND ACETAMINOPHEN 7.5; 325 MG/1; MG/1
1 TABLET ORAL ONCE AS NEEDED
Status: COMPLETED | OUTPATIENT
Start: 2019-05-29 | End: 2019-05-29

## 2019-05-29 RX ORDER — HYDROCODONE BITARTRATE AND ACETAMINOPHEN 7.5; 325 MG/1; MG/1
1-2 TABLET ORAL EVERY 4 HOURS PRN
Qty: 60 TABLET | Refills: 0 | Status: SHIPPED | OUTPATIENT
Start: 2019-05-29

## 2019-05-29 RX ORDER — FAMOTIDINE 10 MG/ML
20 INJECTION, SOLUTION INTRAVENOUS ONCE
Status: COMPLETED | OUTPATIENT
Start: 2019-05-29 | End: 2019-05-29

## 2019-05-29 RX ORDER — ROCURONIUM BROMIDE 10 MG/ML
INJECTION, SOLUTION INTRAVENOUS AS NEEDED
Status: DISCONTINUED | OUTPATIENT
Start: 2019-05-29 | End: 2019-05-29 | Stop reason: SURG

## 2019-05-29 RX ORDER — MIDAZOLAM HYDROCHLORIDE 1 MG/ML
2 INJECTION INTRAMUSCULAR; INTRAVENOUS
Status: DISCONTINUED | OUTPATIENT
Start: 2019-05-29 | End: 2019-05-29 | Stop reason: HOSPADM

## 2019-05-29 RX ORDER — DEXAMETHASONE SODIUM PHOSPHATE 10 MG/ML
INJECTION INTRAMUSCULAR; INTRAVENOUS AS NEEDED
Status: DISCONTINUED | OUTPATIENT
Start: 2019-05-29 | End: 2019-05-29 | Stop reason: SURG

## 2019-05-29 RX ORDER — LIDOCAINE HYDROCHLORIDE 20 MG/ML
INJECTION, SOLUTION INFILTRATION; PERINEURAL AS NEEDED
Status: DISCONTINUED | OUTPATIENT
Start: 2019-05-29 | End: 2019-05-29 | Stop reason: SURG

## 2019-05-29 RX ORDER — PROMETHAZINE HYDROCHLORIDE 25 MG/1
25 TABLET ORAL ONCE AS NEEDED
Status: DISCONTINUED | OUTPATIENT
Start: 2019-05-29 | End: 2019-05-29 | Stop reason: HOSPADM

## 2019-05-29 RX ORDER — DIPHENHYDRAMINE HYDROCHLORIDE 50 MG/ML
12.5 INJECTION INTRAMUSCULAR; INTRAVENOUS
Status: DISCONTINUED | OUTPATIENT
Start: 2019-05-29 | End: 2019-05-29 | Stop reason: HOSPADM

## 2019-05-29 RX ORDER — DIPHENHYDRAMINE HCL 25 MG
25 CAPSULE ORAL
Status: DISCONTINUED | OUTPATIENT
Start: 2019-05-29 | End: 2019-05-29 | Stop reason: HOSPADM

## 2019-05-29 RX ORDER — SODIUM CHLORIDE 0.9 % (FLUSH) 0.9 %
3-10 SYRINGE (ML) INJECTION AS NEEDED
Status: DISCONTINUED | OUTPATIENT
Start: 2019-05-29 | End: 2019-05-29 | Stop reason: HOSPADM

## 2019-05-29 RX ORDER — SODIUM CHLORIDE, SODIUM LACTATE, POTASSIUM CHLORIDE, CALCIUM CHLORIDE 600; 310; 30; 20 MG/100ML; MG/100ML; MG/100ML; MG/100ML
9 INJECTION, SOLUTION INTRAVENOUS CONTINUOUS
Status: DISCONTINUED | OUTPATIENT
Start: 2019-05-29 | End: 2019-05-29 | Stop reason: HOSPADM

## 2019-05-29 RX ORDER — BACITRACIN 500 [USP'U]/G
OINTMENT OPHTHALMIC 3 TIMES DAILY
Status: DISCONTINUED | OUTPATIENT
Start: 2019-05-29 | End: 2019-05-29 | Stop reason: HOSPADM

## 2019-05-29 RX ORDER — SODIUM CHLORIDE 0.9 % (FLUSH) 0.9 %
3 SYRINGE (ML) INJECTION EVERY 12 HOURS SCHEDULED
Status: DISCONTINUED | OUTPATIENT
Start: 2019-05-29 | End: 2019-05-29 | Stop reason: HOSPADM

## 2019-05-29 RX ORDER — ONDANSETRON 2 MG/ML
4 INJECTION INTRAMUSCULAR; INTRAVENOUS ONCE AS NEEDED
Status: COMPLETED | OUTPATIENT
Start: 2019-05-29 | End: 2019-05-29

## 2019-05-29 RX ORDER — ACETAMINOPHEN 325 MG/1
650 TABLET ORAL ONCE AS NEEDED
Status: DISCONTINUED | OUTPATIENT
Start: 2019-05-29 | End: 2019-05-29 | Stop reason: HOSPADM

## 2019-05-29 RX ORDER — GLYCOPYRROLATE 0.2 MG/ML
INJECTION INTRAMUSCULAR; INTRAVENOUS AS NEEDED
Status: DISCONTINUED | OUTPATIENT
Start: 2019-05-29 | End: 2019-05-29 | Stop reason: SURG

## 2019-05-29 RX ORDER — LIDOCAINE HYDROCHLORIDE 40 MG/ML
SOLUTION TOPICAL AS NEEDED
Status: DISCONTINUED | OUTPATIENT
Start: 2019-05-29 | End: 2019-05-29 | Stop reason: SURG

## 2019-05-29 RX ORDER — ONDANSETRON 2 MG/ML
INJECTION INTRAMUSCULAR; INTRAVENOUS AS NEEDED
Status: DISCONTINUED | OUTPATIENT
Start: 2019-05-29 | End: 2019-05-29 | Stop reason: SURG

## 2019-05-29 RX ORDER — KETOROLAC TROMETHAMINE 5 MG/ML
1 SOLUTION OPHTHALMIC 4 TIMES DAILY
Status: DISCONTINUED | OUTPATIENT
Start: 2019-05-30 | End: 2019-05-29 | Stop reason: HOSPADM

## 2019-05-29 RX ORDER — OXYCODONE AND ACETAMINOPHEN 7.5; 325 MG/1; MG/1
1 TABLET ORAL ONCE AS NEEDED
Status: DISCONTINUED | OUTPATIENT
Start: 2019-05-29 | End: 2019-05-29 | Stop reason: HOSPADM

## 2019-05-29 RX ORDER — FENTANYL CITRATE 50 UG/ML
INJECTION, SOLUTION INTRAMUSCULAR; INTRAVENOUS AS NEEDED
Status: DISCONTINUED | OUTPATIENT
Start: 2019-05-29 | End: 2019-05-29 | Stop reason: SURG

## 2019-05-29 RX ORDER — MELOXICAM 15 MG/1
15 TABLET ORAL DAILY
Qty: 60 TABLET | Refills: 2 | Status: SHIPPED | OUTPATIENT
Start: 2019-05-29

## 2019-05-29 RX ORDER — NALOXONE HCL 0.4 MG/ML
0.2 VIAL (ML) INJECTION AS NEEDED
Status: DISCONTINUED | OUTPATIENT
Start: 2019-05-29 | End: 2019-05-29 | Stop reason: HOSPADM

## 2019-05-29 RX ORDER — METHYLPREDNISOLONE ACETATE 80 MG/ML
INJECTION, SUSPENSION INTRA-ARTICULAR; INTRALESIONAL; INTRAMUSCULAR; SOFT TISSUE AS NEEDED
Status: DISCONTINUED | OUTPATIENT
Start: 2019-05-29 | End: 2019-05-29 | Stop reason: HOSPADM

## 2019-05-29 RX ORDER — IPRATROPIUM BROMIDE AND ALBUTEROL SULFATE 2.5; .5 MG/3ML; MG/3ML
3 SOLUTION RESPIRATORY (INHALATION) ONCE AS NEEDED
Status: DISCONTINUED | OUTPATIENT
Start: 2019-05-29 | End: 2019-05-29 | Stop reason: HOSPADM

## 2019-05-29 RX ORDER — CEFAZOLIN SODIUM 1 G/50ML
INJECTION, SOLUTION INTRAVENOUS
Status: COMPLETED
Start: 2019-05-29 | End: 2019-05-29

## 2019-05-29 RX ORDER — FENTANYL CITRATE 50 UG/ML
50 INJECTION, SOLUTION INTRAMUSCULAR; INTRAVENOUS
Status: DISCONTINUED | OUTPATIENT
Start: 2019-05-29 | End: 2019-05-29 | Stop reason: HOSPADM

## 2019-05-29 RX ORDER — PROPOFOL 10 MG/ML
VIAL (ML) INTRAVENOUS AS NEEDED
Status: DISCONTINUED | OUTPATIENT
Start: 2019-05-29 | End: 2019-05-29 | Stop reason: SURG

## 2019-05-29 RX ADMIN — MIDAZOLAM 1 MG: 1 INJECTION INTRAMUSCULAR; INTRAVENOUS at 08:41

## 2019-05-29 RX ADMIN — ONDANSETRON HYDROCHLORIDE 4 MG: 2 SOLUTION INTRAMUSCULAR; INTRAVENOUS at 13:26

## 2019-05-29 RX ADMIN — PHENYLEPHRINE HYDROCHLORIDE 100 MCG: 10 INJECTION INTRAVENOUS at 09:17

## 2019-05-29 RX ADMIN — SODIUM CHLORIDE, POTASSIUM CHLORIDE, SODIUM LACTATE AND CALCIUM CHLORIDE 9 ML/HR: 600; 310; 30; 20 INJECTION, SOLUTION INTRAVENOUS at 07:59

## 2019-05-29 RX ADMIN — PROPOFOL 150 MG: 10 INJECTION, EMULSION INTRAVENOUS at 08:57

## 2019-05-29 RX ADMIN — FAMOTIDINE 20 MG: 10 INJECTION INTRAVENOUS at 08:00

## 2019-05-29 RX ADMIN — DEXAMETHASONE SODIUM PHOSPHATE 10 MG: 10 INJECTION INTRAMUSCULAR; INTRAVENOUS at 09:09

## 2019-05-29 RX ADMIN — ROCURONIUM BROMIDE 35 MG: 10 INJECTION INTRAVENOUS at 09:04

## 2019-05-29 RX ADMIN — FENTANYL CITRATE 100 MCG: 50 INJECTION INTRAMUSCULAR; INTRAVENOUS at 08:54

## 2019-05-29 RX ADMIN — PROPOFOL 50 MG: 10 INJECTION, EMULSION INTRAVENOUS at 09:04

## 2019-05-29 RX ADMIN — LIDOCAINE HYDROCHLORIDE 1 EACH: 40 SOLUTION TOPICAL at 09:07

## 2019-05-29 RX ADMIN — PROPARACAINE HYDROCHLORIDE 1 DROP: 5 SOLUTION/ DROPS OPHTHALMIC at 11:47

## 2019-05-29 RX ADMIN — PROPARACAINE HYDROCHLORIDE 1 DROP: 5 SOLUTION/ DROPS OPHTHALMIC at 14:01

## 2019-05-29 RX ADMIN — SODIUM CHLORIDE, POTASSIUM CHLORIDE, SODIUM LACTATE AND CALCIUM CHLORIDE: 600; 310; 30; 20 INJECTION, SOLUTION INTRAVENOUS at 10:21

## 2019-05-29 RX ADMIN — BACITRACIN: 500 OINTMENT OPHTHALMIC at 11:56

## 2019-05-29 RX ADMIN — HYDROMORPHONE HYDROCHLORIDE 0.5 MG: 1 INJECTION, SOLUTION INTRAMUSCULAR; INTRAVENOUS; SUBCUTANEOUS at 12:22

## 2019-05-29 RX ADMIN — FENTANYL CITRATE 50 MCG: 50 INJECTION INTRAMUSCULAR; INTRAVENOUS at 09:04

## 2019-05-29 RX ADMIN — CEFAZOLIN SODIUM 1 G: 1 INJECTION, SOLUTION INTRAVENOUS at 09:09

## 2019-05-29 RX ADMIN — NEOSTIGMINE METHYLSULFATE 3 MG: 1 INJECTION INTRAMUSCULAR; INTRAVENOUS; SUBCUTANEOUS at 10:45

## 2019-05-29 RX ADMIN — HYDROCODONE BITARTRATE AND ACETAMINOPHEN 1 TABLET: 7.5; 325 TABLET ORAL at 12:13

## 2019-05-29 RX ADMIN — ONDANSETRON 4 MG: 2 INJECTION INTRAMUSCULAR; INTRAVENOUS at 10:41

## 2019-05-29 RX ADMIN — LIDOCAINE HYDROCHLORIDE 60 MG: 20 INJECTION, SOLUTION INFILTRATION; PERINEURAL at 08:57

## 2019-05-29 RX ADMIN — GLYCOPYRROLATE 0.2 MG: 0.2 INJECTION INTRAMUSCULAR; INTRAVENOUS at 10:45

## 2019-05-29 RX ADMIN — FENTANYL CITRATE 50 MCG: 50 INJECTION INTRAMUSCULAR; INTRAVENOUS at 11:54

## 2019-05-29 RX ADMIN — FENTANYL CITRATE 50 MCG: 50 INJECTION INTRAMUSCULAR; INTRAVENOUS at 12:06

## 2019-05-29 NOTE — ANESTHESIA PROCEDURE NOTES
Airway  Urgency: elective    Date/Time: 5/29/2019 9:07 AM  Airway not difficult    General Information and Staff    Patient location during procedure: OR  Anesthesiologist: Arjun Conde MD  CRNA: Shauna Liz CRNA    Indications and Patient Condition  Indications for airway management: airway protection    Preoxygenated: yes  Mask difficulty assessment: 2 - vent by mask + OA or adjuvant +/- NMBA    Final Airway Details  Final airway type: endotracheal airway      Successful airway: ETT  Cuffed: yes   Successful intubation technique: direct laryngoscopy  Facilitating devices/methods: intubating stylet and cricoid pressure  Endotracheal tube insertion site: oral  Blade: Tejada  Blade size: 2  ETT size (mm): 7.0  Cormack-Lehane Classification: grade I - full view of glottis  Placement verified by: chest auscultation and capnometry   Measured from: teeth  ETT to teeth (cm): 19  Number of attempts at approach: 1    Additional Comments  Atraumatic. No dental damage noted.

## 2019-05-29 NOTE — ANESTHESIA PROCEDURE NOTES
Airway  Urgency: elective    Date/Time: 5/29/2019 9:00 AM  Airway not difficult    General Information and Staff    Patient location during procedure: OR  Anesthesiologist: Arjun Conde MD  CRNA: Shauna Liz CRNA    Indications and Patient Condition  Indications for airway management: airway protection    Preoxygenated: yes  Mask difficulty assessment: 0 - not attempted    Final Airway Details  Final airway type: supraglottic airway      Successful airway: classic  Size 4    Number of attempts at approach: 1    Additional Comments  Atraumatic. No dental damage noted.

## 2019-05-29 NOTE — ANESTHESIA POSTPROCEDURE EVALUATION
"Patient: Terri Grant    Procedure Summary     Date:  05/29/19 Room / Location:  [facility] OR [address] [place] [address] MAIN OR    Anesthesia Start:  0851 Anesthesia Stop:  1108    Procedure:  LEFT SUPRASCAPULAR NERVE REBUILD (N/A Arm Lower) Diagnosis:      Surgeon:  Shane Ziegler MD Provider:  Arjun Conde MD    Anesthesia Type:  general ASA Status:  2          Anesthesia Type: general  Last vitals  BP   142/78 (05/29/19 1325)   Temp   36.4 °C (97.5 °F) (05/29/19 1102)   Pulse   80 (05/29/19 1325)   Resp   16 (05/29/19 1325)     SpO2   97 % (05/29/19 1325)     Post Anesthesia Care and Evaluation    Patient location during evaluation: bedside  Patient participation: complete - patient participated  Level of consciousness: awake  Pain management: adequate  Airway patency: patent  Anesthetic complications: No anesthetic complications  PONV Status: controlled  Cardiovascular status: acceptable  Respiratory status: acceptable  Hydration status: acceptable    Comments: */78   Pulse 80   Temp 36.4 °C (97.5 °F) (Oral)   Resp 16   Ht 165.1 cm (65\")   Wt 55 kg (121 lb 4.1 oz)   LMP  (LMP Unknown)   SpO2 97%   BMI 20.18 kg/m²         "

## 2019-05-29 NOTE — ANESTHESIA PREPROCEDURE EVALUATION
Anesthesia Evaluation     NPO Solid Status: > 8 hours  NPO Liquid Status: > 2 hours           Airway   Mallampati: II  Small opening and Possible difficult intubation  Dental      Pulmonary - normal exam   Cardiovascular - normal exam        Neuro/Psych  (+) numbness,     GI/Hepatic/Renal/Endo    (+)  GERD,      Musculoskeletal     (+) neck pain,   Abdominal    Substance History      OB/GYN          Other                        Anesthesia Plan    ASA 2     general     intravenous induction   Anesthetic plan, all risks, benefits, and alternatives have been provided, discussed and informed consent has been obtained with: patient and spouse/significant other.

## 2019-06-06 ENCOUNTER — APPOINTMENT (OUTPATIENT)
Dept: MAMMOGRAPHY | Facility: HOSPITAL | Age: 59
End: 2019-06-06

## 2019-06-27 ENCOUNTER — TELEPHONE (OUTPATIENT)
Dept: NEUROSURGERY | Facility: CLINIC | Age: 59
End: 2019-06-27

## 2019-06-27 NOTE — TELEPHONE ENCOUNTER
"Patient called to ask the question, \"Why didn't the surgery work?\".  She would like to have an answer so she can explain to her PCP.        07/03/2019, patient called again to see if Dr. Ziegler was able to answer her question.  Patient was informed that Dr. Ziegler was out of town and that I would ask him on Monday July 8 for this information.  Patient would like office notes sent to her PCP Dr. Jessica Hewitt as well.    "

## 2019-07-23 ENCOUNTER — HOSPITAL ENCOUNTER (OUTPATIENT)
Dept: MAMMOGRAPHY | Facility: HOSPITAL | Age: 59
Discharge: HOME | End: 2019-07-23
Admitting: INTERNAL MEDICINE

## 2019-07-23 DIAGNOSIS — Z12.31 VISIT FOR SCREENING MAMMOGRAM: ICD-10-CM

## 2019-07-23 PROCEDURE — 77067 SCR MAMMO BI INCL CAD: CPT

## 2020-02-17 ENCOUNTER — TELEPHONE (OUTPATIENT)
Dept: NEUROSURGERY | Facility: CLINIC | Age: 60
End: 2020-02-17

## 2020-02-17 NOTE — TELEPHONE ENCOUNTER
Patient dropped off a blue folder with her records from previous physicians. She is wanting to be seen for left shoulder pain. Mary and Maday both reviewed her images as Maday has previously seen this patient at Dr. Mcdonough office. She also had Left suprascapular nerve entrapment surgery with Dr. Ziegler 5/2019. We do not treat what she needs to be seen for. The patient would be better served by seeing [place] or [facility].     Called the patient back and explained the above and she verbalized understanding.

## 2020-06-01 ENCOUNTER — TRANSCRIBE ORDERS (OUTPATIENT)
Dept: ADMINISTRATIVE | Facility: HOSPITAL | Age: 60
End: 2020-06-01

## 2020-06-01 DIAGNOSIS — Z12.31 SCREENING MAMMOGRAM, ENCOUNTER FOR: Primary | ICD-10-CM

## 2020-08-03 ENCOUNTER — APPOINTMENT (OUTPATIENT)
Dept: MAMMOGRAPHY | Facility: HOSPITAL | Age: 60
End: 2020-08-03

## 2020-08-10 ENCOUNTER — APPOINTMENT (OUTPATIENT)
Dept: MAMMOGRAPHY | Facility: HOSPITAL | Age: 60
End: 2020-08-10

## 2020-09-15 ENCOUNTER — HOSPITAL ENCOUNTER (OUTPATIENT)
Dept: MAMMOGRAPHY | Facility: HOSPITAL | Age: 60
Discharge: HOME | End: 2020-09-15
Admitting: INTERNAL MEDICINE

## 2020-09-15 DIAGNOSIS — Z12.31 SCREENING MAMMOGRAM, ENCOUNTER FOR: ICD-10-CM

## 2020-09-15 PROCEDURE — 77067 SCR MAMMO BI INCL CAD: CPT

## 2020-09-15 PROCEDURE — 77063 BREAST TOMOSYNTHESIS BI: CPT

## 2020-10-06 ENCOUNTER — APPOINTMENT (OUTPATIENT)
Dept: MAMMOGRAPHY | Facility: HOSPITAL | Age: 60
End: 2020-10-06

## 2020-10-07 ENCOUNTER — OFFICE VISIT (OUTPATIENT)
Dept: OBSTETRICS AND GYNECOLOGY | Facility: CLINIC | Age: 60
End: 2020-10-07

## 2020-10-07 VITALS
HEART RATE: 91 BPM | HEIGHT: 66 IN | WEIGHT: 120.4 LBS | SYSTOLIC BLOOD PRESSURE: 134 MMHG | DIASTOLIC BLOOD PRESSURE: 83 MMHG | BODY MASS INDEX: 19.35 KG/M2

## 2020-10-07 DIAGNOSIS — Z01.419 WELL WOMAN EXAM: Primary | ICD-10-CM

## 2020-10-07 DIAGNOSIS — Z12.4 CERVICAL CANCER SCREENING: ICD-10-CM

## 2020-10-07 PROCEDURE — 99386 PREV VISIT NEW AGE 40-64: CPT | Performed by: STUDENT IN AN ORGANIZED HEALTH CARE EDUCATION/TRAINING PROGRAM

## 2020-10-07 RX ORDER — OMEGA-3S/DHA/EPA/FISH OIL/D3 300MG-1000
400 CAPSULE ORAL DAILY
COMMUNITY

## 2020-10-07 RX ORDER — ASCORBIC ACID 500 MG
500 TABLET ORAL DAILY
COMMUNITY

## 2020-10-07 NOTE — PROGRESS NOTES
GYN Annual Exam     CC- Here for annual exam.     Terri Grant is a 60 y.o. female who presents for annual well woman exam. Postmenopausal status. No vaginal bleeding, spotting, or discharge. She has no complaints today and reports doing well.     She has two sons who she speaks very highly of and cannot wait to see once COVID-19 travel restrictions have ended.     OB History        2    Para   2    Term   2            AB        Living           SAB        TAB        Ectopic        Molar        Multiple        Live Births                    Current contraception: post menopausal status  History of abnormal Pap smear: no  Family history of uterine, colon or ovarian cancer: no  History of abnormal mammogram: yes- followed up with ultrasound and no further follow up indicated   Family history of breast cancer: yes - maternal aunt- diagnosed 60s   Last Pap : 2016- NILM  Last mammogram: 20- BIRADS 1   Last colonoscopy: next year () for follow up of colon polyps   Last DEXA: n/a   Parental Hip Fracture: denies     Past Medical History:   Diagnosis Date   • Abdominal pain, epigastric    • Acid reflux    • Arthritis     osteoarthritis gets cortisone injections yearly   • Bone spur    • Cervical disc disorder    • Colon polyps    • Environmental and seasonal allergies    • Nausea    • Neck pain    • Pancreatitis    • Suprascapular entrapment neuropathy of left side        Past Surgical History:   Procedure Laterality Date   • BUNIONECTOMY Right    • CHOLECYSTECTOMY     • COLONOSCOPY  3 YEARS AGO   • COLONOSCOPY N/A 3/9/2016    Procedure: COLONOSCOPYwith hot polypectomy times 1, cold biopsy polypectomy times 3;  Surgeon: Betty Lopez MD;  Location: [facility] ENDOSCOPY;  Service:    • ELBOW COLLATERAL LIGAMENT RECONSTRUCTION     • EXCISION BENIGN SKIN LESION SCALP / NECK / HANDS / FEET / GENITALIA     • GUM SURGERY     • HX OVARIAN CYSTECTOMY     • MOUTH SURGERY     • OTHER SURGICAL HISTORY       destruction of benign lesion   • ULNAR NERVE TRANSPOSITION N/A 5/29/2019    Procedure: LEFT SUPRASCAPULAR NERVE REBUILD;  Surgeon: Shane Ziegler MD;  Location: [facility];  Service: Neurosurgery         Current Outpatient Medications:   •  acetaminophen (TYLENOL) 500 MG tablet, Take 1,000 mg by mouth As Needed for Mild Pain ., Disp: , Rfl:   •  cetirizine (zyrTEC) 10 MG tablet, Take 10 mg by mouth Daily., Disp: , Rfl:   •  cholecalciferol (VITAMIN D3) 10 MCG (400 UNIT) tablet, Take 400 Units by mouth Daily., Disp: , Rfl:   •  cyclobenzaprine (FLEXERIL) 10 MG tablet, Take 10 mg by mouth 2 (Two) Times a Day As Needed. (uses twice a week), Disp: , Rfl:   •  HYDROcodone-acetaminophen (NORCO) 7.5-325 MG per tablet, Take 1 tablet by mouth Every 4 (Four) Hours As Needed for Moderate Pain ., Disp: , Rfl:   •  promethazine (PHENERGAN) 25 MG tablet, Take 25 mg by mouth Every 6 (Six) Hours As Needed for Nausea or Vomiting., Disp: , Rfl:   •  vitamin C (ASCORBIC ACID) 500 MG tablet, Take 500 mg by mouth Daily., Disp: , Rfl:   •  HYDROcodone-acetaminophen (NORCO) 7.5-325 MG per tablet, Take 1-2 tablets by mouth Every 4 (Four) Hours As Needed for Moderate Pain  (Pain)., Disp: 60 tablet, Rfl: 0  •  meloxicam (MOBIC) 15 MG tablet, Take 1 tablet by mouth Daily., Disp: 60 tablet, Rfl: 2  •  Multiple Vitamins-Minerals (AIRBORNE GUMMIES) chewable tablet, Chew 1 tablet Daily., Disp: , Rfl:     Allergies   Allergen Reactions   • Augmentin [Amoxicillin-Pot Clavulanate] Nausea And Vomiting       Social History     Tobacco Use   • Smoking status: Never Smoker   • Smokeless tobacco: Never Used   Substance Use Topics   • Alcohol use: Yes     Alcohol/week: 3.0 standard drinks     Types: 3 Glasses of wine per week   • Drug use: No       Family History   Problem Relation Age of Onset   • Stroke Mother    • Heart disease Mother    • COPD Mother    • Kidney disease Mother    • Hypertension Mother    • Emphysema Mother    • Diabetes  "Father    • Heart disease Father    • Diabetes Other         Unspecified Aunt   • Cancer Other         Unspecified Aunt   • Heart disease Other         Unspecified Aunt, Grandmother and Grandfather   • Breast cancer Maternal Aunt    • Malig Hyperthermia Neg Hx        Review of Systems   Constitutional: Negative for chills and fever.   HENT: Negative for congestion and sore throat.    Eyes: Negative for photophobia and visual disturbance.   Respiratory: Negative for cough and shortness of breath.    Cardiovascular: Negative for chest pain and leg swelling.   Gastrointestinal: Negative for abdominal distention, abdominal pain, constipation and diarrhea.   Endocrine: Negative for cold intolerance and heat intolerance.   Genitourinary: Negative for breast discharge, breast lump, breast pain, difficulty urinating, vaginal bleeding and vaginal discharge.   Musculoskeletal: Negative for arthralgias and myalgias.   Skin: Negative for rash and bruise.   Allergic/Immunologic: Positive for environmental allergies. Negative for food allergies.   Neurological: Negative for light-headedness and headache.   Psychiatric/Behavioral: Negative for agitation. The patient is not nervous/anxious.        /83   Pulse 91   Ht 166.4 cm (65.5\")   Wt 54.6 kg (120 lb 6.4 oz)   LMP  (LMP Unknown)   BMI 19.73 kg/m²     Physical Exam  Vitals signs reviewed. Exam conducted with a chaperone present.   Constitutional:       Appearance: Normal appearance.   HENT:      Head: Normocephalic and atraumatic.      Right Ear: External ear normal.      Left Ear: External ear normal.   Eyes:      Extraocular Movements: Extraocular movements intact.      Pupils: Pupils are equal, round, and reactive to light.   Neck:      Musculoskeletal: Normal range of motion and neck supple.   Cardiovascular:      Rate and Rhythm: Normal rate and regular rhythm.   Pulmonary:      Effort: Pulmonary effort is normal. No respiratory distress.   Chest:      Breasts: "         Right: Normal. No swelling, bleeding, inverted nipple, mass, nipple discharge, skin change or tenderness.         Left: Normal. No swelling, bleeding, inverted nipple, mass, nipple discharge, skin change or tenderness.   Abdominal:      General: There is no distension.      Palpations: Abdomen is soft. There is no mass.      Tenderness: There is no abdominal tenderness. There is no guarding or rebound.      Hernia: No hernia is present.   Genitourinary:     General: Normal vulva.      Labia:         Right: No rash, tenderness, lesion or injury.         Left: No rash, tenderness, lesion or injury.       Urethra: No prolapse or urethral lesion.      Vagina: Normal. No vaginal discharge, erythema, tenderness, bleeding or lesions.      Cervix: Normal.      Uterus: Normal. Not enlarged, not fixed and not tender.       Adnexa: Right adnexa normal and left adnexa normal.        Right: No mass, tenderness or fullness.          Left: No mass, tenderness or fullness.     Musculoskeletal: Normal range of motion.         General: No swelling.   Lymphadenopathy:      Upper Body:      Right upper body: No supraclavicular or axillary adenopathy.      Left upper body: No supraclavicular or axillary adenopathy.      Lower Body: No right inguinal adenopathy. No left inguinal adenopathy.   Skin:     General: Skin is warm and dry.   Neurological:      General: No focal deficit present.      Mental Status: She is alert and oriented to person, place, and time.   Psychiatric:         Mood and Affect: Mood normal.         Behavior: Behavior normal.            Assessment     1) GYN annual well woman exam.   2) Cervical cancer screening      Plan     1) Breast Health - Clinical breast exam & mammogram yearly, Self breast awareness monthly. Reviewed mammogram results from 06/2020.   2) Pap - Pap smear with cotesting collected today.   3) Smoking status- non-smoker   4) Colon health - screening colonoscopy recommended if not up to  date  5) Bone health - Weight bearing exercise, dietary calcium recommendations and vitamin D reviewed.   6) Activity recommends - Adult 150-300 min/week of multi-component physical activities that include balance training, aerobic and physical strengthening.    Avoidance of distracted driving - texts/phone calls while driving.   7) Follow up prn and one year      Ivette Enciso MD  10/12/2020  00:12 EDT

## 2020-10-12 PROBLEM — Z01.419 WELL WOMAN EXAM: Status: ACTIVE | Noted: 2020-10-07

## 2020-10-12 LAB
CYTOLOGIST CVX/VAG CYTO: NORMAL
CYTOLOGY CVX/VAG DOC CYTO: NORMAL
CYTOLOGY CVX/VAG DOC THIN PREP: NORMAL
DX ICD CODE: NORMAL
HIV 1 & 2 AB SER-IMP: NORMAL
HPV I/H RISK 4 DNA CVX QL PROBE+SIG AMP: NEGATIVE
Lab: NORMAL
OTHER STN SPEC: NORMAL
RECOM F/U CVX/VAG CYTO: NORMAL
STAT OF ADQ CVX/VAG CYTO-IMP: NORMAL

## 2020-10-13 ENCOUNTER — TELEPHONE (OUTPATIENT)
Dept: OBSTETRICS AND GYNECOLOGY | Facility: CLINIC | Age: 60
End: 2020-10-13

## 2020-10-13 NOTE — TELEPHONE ENCOUNTER
Left message for patient to call office. She needs a repeat pap smear in 2-4 months per Dr. Enciso. Please see notes.

## 2020-10-14 NOTE — TELEPHONE ENCOUNTER
Talked to patient she is aware of needing repeat pap. She was not happy about having to come back and said that she will not be able to schedule at this time. She will call back later when its a better time for her.

## 2021-02-10 ENCOUNTER — TRANSCRIBE ORDERS (OUTPATIENT)
Dept: ADMINISTRATIVE | Facility: HOSPITAL | Age: 61
End: 2021-02-10

## 2021-02-10 DIAGNOSIS — Z12.31 SCREENING MAMMOGRAM, ENCOUNTER FOR: Primary | ICD-10-CM

## 2021-03-22 ENCOUNTER — BULK ORDERING (OUTPATIENT)
Dept: CASE MANAGEMENT | Facility: OTHER | Age: 61
End: 2021-03-22

## 2021-03-22 DIAGNOSIS — Z23 IMMUNIZATION DUE: ICD-10-CM

## 2021-09-20 ENCOUNTER — HOSPITAL ENCOUNTER (OUTPATIENT)
Dept: MAMMOGRAPHY | Facility: HOSPITAL | Age: 61
Discharge: HOME | End: 2021-09-20
Admitting: INTERNAL MEDICINE

## 2021-09-20 DIAGNOSIS — Z12.31 SCREENING MAMMOGRAM, ENCOUNTER FOR: ICD-10-CM

## 2021-09-20 PROCEDURE — 77067 SCR MAMMO BI INCL CAD: CPT

## 2021-09-20 PROCEDURE — 77063 BREAST TOMOSYNTHESIS BI: CPT

## 2021-12-08 ENCOUNTER — PRE-PROCEDURE SCREENING (OUTPATIENT)
Dept: GASTROENTEROLOGY | Facility: CLINIC | Age: 61
End: 2021-12-08

## 2021-12-08 ENCOUNTER — PREP FOR SURGERY (OUTPATIENT)
Dept: OTHER | Facility: HOSPITAL | Age: 61
End: 2021-12-08

## 2021-12-08 DIAGNOSIS — Z86.010 HX OF COLONIC POLYP: Primary | ICD-10-CM

## 2021-12-08 NOTE — TELEPHONE ENCOUNTER
Last scope 3/9/16 in epic--Personal history of polyps--No family history of polyps or colon cancer--No blood thinners--Medications:              acetaminophen (TYLENOL) 500 MG tablet  cetirizine (zyrTEC) 10 MG tablet  cholecalciferol (VITAMIN D3) 10 MCG (400 UNIT) tablet  cyclobenzaprine (FLEXERIL) 10 MG tablet  HYDROcodone-acetaminophen (NORCO) 7.5-325 MG per tablet  HYDROcodone-acetaminophen (NORCO) 7.5-325 MG per tablet  meloxicam (MOBIC) 15 MG tablet  Multiple Vitamins-Minerals (AIRBORNE GUMMIES) chewable tablet  promethazine (PHENERGAN) 25 MG tablet  vitamin C (ASCORBIC ACID) 500 MG tablet      Pt verbalized and understood that it would be few weeks before been schedule

## 2022-01-21 PROBLEM — Z86.010 HX OF COLONIC POLYP: Status: ACTIVE | Noted: 2022-01-21

## 2022-01-21 PROBLEM — Z86.0100 HX OF COLONIC POLYP: Status: ACTIVE | Noted: 2022-01-21

## 2022-05-02 RX ORDER — LORATADINE 10 MG/1
10 TABLET ORAL DAILY
COMMUNITY

## 2022-05-03 ENCOUNTER — HOSPITAL ENCOUNTER (OUTPATIENT)
Facility: HOSPITAL | Age: 62
Setting detail: HOSPITAL OUTPATIENT SURGERY
Discharge: HOME | End: 2022-05-03
Attending: INTERNAL MEDICINE | Admitting: INTERNAL MEDICINE

## 2022-05-03 ENCOUNTER — ANESTHESIA (OUTPATIENT)
Dept: GASTROENTEROLOGY | Facility: HOSPITAL | Age: 62
End: 2022-05-03

## 2022-05-03 ENCOUNTER — ANESTHESIA EVENT (OUTPATIENT)
Dept: GASTROENTEROLOGY | Facility: HOSPITAL | Age: 62
End: 2022-05-03

## 2022-05-03 VITALS
HEIGHT: 65 IN | BODY MASS INDEX: 20.66 KG/M2 | DIASTOLIC BLOOD PRESSURE: 79 MMHG | HEART RATE: 86 BPM | TEMPERATURE: 98.2 F | SYSTOLIC BLOOD PRESSURE: 127 MMHG | WEIGHT: 124 LBS | OXYGEN SATURATION: 100 % | RESPIRATION RATE: 16 BRPM

## 2022-05-03 DIAGNOSIS — Z86.010 HX OF COLONIC POLYP: ICD-10-CM

## 2022-05-03 PROCEDURE — 25010000002 PROPOFOL 10 MG/ML EMULSION: Performed by: ANESTHESIOLOGY

## 2022-05-03 PROCEDURE — 45380 COLONOSCOPY AND BIOPSY: CPT | Performed by: INTERNAL MEDICINE

## 2022-05-03 PROCEDURE — 88305 TISSUE EXAM BY PATHOLOGIST: CPT | Performed by: INTERNAL MEDICINE

## 2022-05-03 PROCEDURE — 25010000002 ONDANSETRON PER 1 MG: Performed by: ANESTHESIOLOGY

## 2022-05-03 PROCEDURE — S0260 H&P FOR SURGERY: HCPCS | Performed by: INTERNAL MEDICINE

## 2022-05-03 RX ORDER — ONDANSETRON 2 MG/ML
INJECTION INTRAMUSCULAR; INTRAVENOUS AS NEEDED
Status: DISCONTINUED | OUTPATIENT
Start: 2022-05-03 | End: 2022-05-03 | Stop reason: SURG

## 2022-05-03 RX ORDER — PROPOFOL 10 MG/ML
VIAL (ML) INTRAVENOUS CONTINUOUS PRN
Status: DISCONTINUED | OUTPATIENT
Start: 2022-05-03 | End: 2022-05-03 | Stop reason: SURG

## 2022-05-03 RX ORDER — SODIUM CHLORIDE, SODIUM LACTATE, POTASSIUM CHLORIDE, CALCIUM CHLORIDE 600; 310; 30; 20 MG/100ML; MG/100ML; MG/100ML; MG/100ML
1000 INJECTION, SOLUTION INTRAVENOUS CONTINUOUS
Status: DISCONTINUED | OUTPATIENT
Start: 2022-05-03 | End: 2022-05-03 | Stop reason: HOSPADM

## 2022-05-03 RX ORDER — SODIUM CHLORIDE 0.9 % (FLUSH) 0.9 %
10 SYRINGE (ML) INJECTION AS NEEDED
Status: DISCONTINUED | OUTPATIENT
Start: 2022-05-03 | End: 2022-05-03 | Stop reason: HOSPADM

## 2022-05-03 RX ORDER — LIDOCAINE HYDROCHLORIDE 20 MG/ML
INJECTION, SOLUTION INFILTRATION; PERINEURAL AS NEEDED
Status: DISCONTINUED | OUTPATIENT
Start: 2022-05-03 | End: 2022-05-03 | Stop reason: SURG

## 2022-05-03 RX ADMIN — PROPOFOL 200 MCG/KG/MIN: 10 INJECTION, EMULSION INTRAVENOUS at 08:03

## 2022-05-03 RX ADMIN — LIDOCAINE HYDROCHLORIDE 60 MG: 20 INJECTION, SOLUTION INFILTRATION; PERINEURAL at 08:04

## 2022-05-03 RX ADMIN — SODIUM CHLORIDE, POTASSIUM CHLORIDE, SODIUM LACTATE AND CALCIUM CHLORIDE 1000 ML: 600; 310; 30; 20 INJECTION, SOLUTION INTRAVENOUS at 07:52

## 2022-05-03 RX ADMIN — ONDANSETRON 4 MG: 2 INJECTION INTRAMUSCULAR; INTRAVENOUS at 08:05

## 2022-05-03 NOTE — DISCHARGE INSTRUCTIONS

## 2022-05-03 NOTE — ANESTHESIA PREPROCEDURE EVALUATION
Anesthesia Evaluation     Patient summary reviewed and Nursing notes reviewed   NPO Solid Status: > 8 hours  NPO Liquid Status: > 4 hours           Airway   Mallampati: I  TM distance: >3 FB  Neck ROM: full  No difficulty expected  Dental - normal exam     Pulmonary - negative pulmonary ROS and normal exam   Cardiovascular - negative cardio ROS and normal exam        Neuro/Psych  (+) numbness,    GI/Hepatic/Renal/Endo    (+)  GERD,      Musculoskeletal     (+) neck pain,   Abdominal  - normal exam    Bowel sounds: normal.   Substance History - negative use     OB/GYN negative ob/gyn ROS         Other   arthritis,                      Anesthesia Plan    ASA 2     MAC       Anesthetic plan, all risks, benefits, and alternatives have been provided, discussed and informed consent has been obtained with: patient.        CODE STATUS:

## 2022-05-03 NOTE — H&P
[facility]  Pre Procedure History & Physical    Chief Complaint:   H/o polyps    Subjective     HPI:   61 yo here today for colonoscopy.  Pt reports no FH CRC/polyps.  Patient denies GI symptoms currrently.  Last exam 2016    Past Medical History:   Past Medical History:   Diagnosis Date   • Abdominal pain, epigastric    • Acid reflux    • Arthritis     osteoarthritis gets cortisone injections yearly   • Bone spur    • Bronchitis    • Cervical disc disorder    • Colon polyps    • Environmental and seasonal allergies    • Nausea    • Neck pain    • Pancreatitis    • Seasonal allergies    • Suprascapular entrapment neuropathy of left side        Past Surgical History:  Past Surgical History:   Procedure Laterality Date   • BUNIONECTOMY Right    • CHOLECYSTECTOMY     • COLONOSCOPY  3 YEARS AGO   • COLONOSCOPY N/A 3/9/2016    Procedure: COLONOSCOPYwith hot polypectomy times 1, cold biopsy polypectomy times 3;  Surgeon: Betty Lopez MD;  Location: [facility] ENDOSCOPY;  Service:    • ELBOW COLLATERAL LIGAMENT RECONSTRUCTION     • EXCISION BENIGN SKIN LESION SCALP / NECK / HANDS / FEET / GENITALIA     • GUM SURGERY     • HX OVARIAN CYSTECTOMY     • MOUTH SURGERY     • OTHER SURGICAL HISTORY      destruction of benign lesion   • ULNAR NERVE TRANSPOSITION N/A 5/29/2019    Procedure: LEFT SUPRASCAPULAR NERVE REBUILD;  Surgeon: Shane Ziegler MD;  Location: [facility] MAIN OR;  Service: Neurosurgery       Family History:  Family History   Problem Relation Age of Onset   • Stroke Mother    • Heart disease Mother    • COPD Mother    • Kidney disease Mother    • Hypertension Mother    • Emphysema Mother    • Diabetes Father    • Heart disease Father    • Diabetes Other         Unspecified Aunt   • Cancer Other         Unspecified Aunt   • Heart disease Other         Unspecified Aunt, Grandmother and Grandfather   • Breast cancer Maternal Aunt    • Malig Hyperthermia Neg Hx        Social History:    reports that she has never smoked. She has never used smokeless tobacco. She reports current alcohol use of about 3.0 standard drinks of alcohol per week. She reports that she does not use drugs.    Medications:   Medications Prior to Admission   Medication Sig Dispense Refill Last Dose   • cetirizine (zyrTEC) 10 MG tablet Take 10 mg by mouth Daily.   Past Week at Unknown time   • cholecalciferol (VITAMIN D3) 10 MCG (400 UNIT) tablet Take 400 Units by mouth Daily.   Past Week at Unknown time   • COLLAGEN PO Take  by mouth. 1 scoop collagen peptide powder once a day   Past Week at Unknown time   • cyclobenzaprine (FLEXERIL) 10 MG tablet Take 10 mg by mouth 2 (Two) Times a Day As Needed. (uses twice a week)   Past Week at Unknown time   • HYDROcodone-acetaminophen (NORCO) 7.5-325 MG per tablet Take 1-2 tablets by mouth Every 4 (Four) Hours As Needed for Moderate Pain  (Pain). 60 tablet 0 Past Week at Unknown time   • loratadine (CLARITIN) 10 MG tablet Take 10 mg by mouth Daily.   Past Week at Unknown time   • Multiple Vitamins-Minerals (AIRBORNE GUMMIES) chewable tablet Chew 1 tablet Daily.   Past Week at Unknown time   • promethazine (PHENERGAN) 25 MG tablet Take 25 mg by mouth Every 6 (Six) Hours As Needed for Nausea or Vomiting.   Past Month at Unknown time   • vitamin C (ASCORBIC ACID) 500 MG tablet Take 500 mg by mouth Daily.   Past Week at Unknown time   • acetaminophen (TYLENOL) 500 MG tablet Take 1,000 mg by mouth As Needed for Mild Pain .   More than a month at Unknown time   • HYDROcodone-acetaminophen (NORCO) 7.5-325 MG per tablet Take 1 tablet by mouth Every 4 (Four) Hours As Needed for Moderate Pain .      • meloxicam (MOBIC) 15 MG tablet Take 1 tablet by mouth Daily. 60 tablet 2        Allergies:  Augmentin [amoxicillin-pot clavulanate]    ROS:    Pertinent items are noted in HPI, all other systems reviewed and negative     Objective     Blood pressure 140/83, pulse 86, temperature 98.2 °F (36.8 °C),  "temperature source Oral, resp. rate 12, height 165.1 cm (65\"), weight 56.2 kg (124 lb), SpO2 99 %.    Physical Exam   Constitutional: Pt is oriented to person, place, and time and well-developed, well-nourished, and in no distress.   Mouth/Throat: Oropharynx is clear and moist.   Neck: Normal range of motion.   Cardiovascular: Normal rate, regular rhythm    Pulmonary/Chest: Effort normal    Abdominal: Soft. Nontender  Skin: Skin is warm and dry.   Psychiatric: Mood, memory, affect and judgment normal.     Assessment/Plan     Diagnosis:  H/o polyps    Anticipated Surgical Procedure:  colonoscopy    The risks, benefits, and alternatives of this procedure have been discussed with the patient or the responsible party- the patient understands and agrees to proceed.                                                              "

## 2022-05-04 ENCOUNTER — TELEPHONE (OUTPATIENT)
Dept: GASTROENTEROLOGY | Facility: CLINIC | Age: 62
End: 2022-05-04

## 2022-05-04 LAB
LAB AP CASE REPORT: NORMAL
PATH REPORT.FINAL DX SPEC: NORMAL
PATH REPORT.GROSS SPEC: NORMAL

## 2022-05-04 NOTE — TELEPHONE ENCOUNTER
----- Message from Betty Lopez MD sent at 5/4/2022 12:50 PM EDT -----  The polyp(s) biopsies showed adenomatous change. This is not cancerous but is considered potentially precancerous. Follow-up colonoscopy in 5 years is advised.

## 2022-05-04 NOTE — TELEPHONE ENCOUNTER
Called pt and left  for pt to call back.     C/s placed in recall for 05/03/2022.  Unable to place in  at this time.

## 2022-05-09 ENCOUNTER — TELEPHONE (OUTPATIENT)
Dept: GASTROENTEROLOGY | Facility: CLINIC | Age: 62
End: 2022-05-09

## 2022-05-09 NOTE — TELEPHONE ENCOUNTER
----- Message from Terri Grant sent at 5/6/2022  8:53 PM EDT -----  Regarding: Colonoscopy   Really appreciate Dr. Alexandra taking care of me during that uncomfortable procedure, she is well trusted

## 2022-06-08 ENCOUNTER — TRANSCRIBE ORDERS (OUTPATIENT)
Dept: ADMINISTRATIVE | Facility: HOSPITAL | Age: 62
End: 2022-06-08

## 2022-06-08 DIAGNOSIS — Z12.31 VISIT FOR SCREENING MAMMOGRAM: Primary | ICD-10-CM

## 2022-09-23 ENCOUNTER — HOSPITAL ENCOUNTER (OUTPATIENT)
Dept: MAMMOGRAPHY | Facility: HOSPITAL | Age: 62
Discharge: HOME | End: 2022-09-23
Admitting: INTERNAL MEDICINE

## 2022-09-23 DIAGNOSIS — Z12.31 VISIT FOR SCREENING MAMMOGRAM: ICD-10-CM

## 2022-09-23 PROCEDURE — 77063 BREAST TOMOSYNTHESIS BI: CPT

## 2022-09-23 PROCEDURE — 77067 SCR MAMMO BI INCL CAD: CPT

## 2023-02-28 ENCOUNTER — TRANSCRIBE ORDERS (OUTPATIENT)
Dept: ADMINISTRATIVE | Facility: HOSPITAL | Age: 63
End: 2023-02-28
Payer: COMMERCIAL

## 2023-02-28 DIAGNOSIS — Z12.31 ENCOUNTER FOR SCREENING MAMMOGRAM FOR MALIGNANT NEOPLASM OF BREAST: Primary | ICD-10-CM

## 2023-10-02 ENCOUNTER — HOSPITAL ENCOUNTER (OUTPATIENT)
Dept: MAMMOGRAPHY | Facility: HOSPITAL | Age: 63
Discharge: HOME | End: 2023-10-02
Admitting: INTERNAL MEDICINE
Payer: COMMERCIAL

## 2023-10-02 DIAGNOSIS — Z12.31 ENCOUNTER FOR SCREENING MAMMOGRAM FOR MALIGNANT NEOPLASM OF BREAST: ICD-10-CM

## 2023-10-02 PROCEDURE — 77063 BREAST TOMOSYNTHESIS BI: CPT

## 2023-10-02 PROCEDURE — 77067 SCR MAMMO BI INCL CAD: CPT

## 2023-11-16 ENCOUNTER — HOSPITAL ENCOUNTER (OUTPATIENT)
Dept: GENERAL RADIOLOGY | Facility: HOSPITAL | Age: 63
Discharge: HOME | End: 2023-11-16
Admitting: INTERNAL MEDICINE
Payer: COMMERCIAL

## 2023-11-16 DIAGNOSIS — M25.561 RIGHT KNEE PAIN, UNSPECIFIED CHRONICITY: ICD-10-CM

## 2023-11-16 PROCEDURE — 73560 X-RAY EXAM OF KNEE 1 OR 2: CPT

## 2024-01-29 ENCOUNTER — HOSPITAL ENCOUNTER (OUTPATIENT)
Dept: CARDIOLOGY | Facility: HOSPITAL | Age: 64
Discharge: HOME | End: 2024-01-29
Payer: COMMERCIAL

## 2024-01-29 ENCOUNTER — TRANSCRIBE ORDERS (OUTPATIENT)
Dept: LAB | Facility: HOSPITAL | Age: 64
End: 2024-01-29
Payer: COMMERCIAL

## 2024-01-29 ENCOUNTER — LAB (OUTPATIENT)
Dept: LAB | Facility: HOSPITAL | Age: 64
End: 2024-01-29
Payer: COMMERCIAL

## 2024-01-29 DIAGNOSIS — S83.241A ACUTE MEDIAL MENISCUS TEAR OF RIGHT KNEE, INITIAL ENCOUNTER: ICD-10-CM

## 2024-01-29 DIAGNOSIS — S83.241A ACUTE MEDIAL MENISCUS TEAR OF RIGHT KNEE, INITIAL ENCOUNTER: Primary | ICD-10-CM

## 2024-01-29 LAB
ALBUMIN SERPL-MCNC: 5 G/DL (ref 3.5–5.2)
ALBUMIN/GLOB SERPL: 2.1 G/DL
ALP SERPL-CCNC: 77 U/L (ref 39–117)
ALT SERPL W P-5'-P-CCNC: 22 U/L (ref 1–33)
ANION GAP SERPL CALCULATED.3IONS-SCNC: 11.3 MMOL/L (ref 5–15)
AST SERPL-CCNC: 19 U/L (ref 1–32)
BILIRUB SERPL-MCNC: 0.4 MG/DL (ref 0–1.2)
BUN SERPL-MCNC: 14 MG/DL (ref 8–23)
BUN/CREAT SERPL: 18.9 (ref 7–25)
CALCIUM SPEC-SCNC: 9.6 MG/DL (ref 8.6–10.5)
CHLORIDE SERPL-SCNC: 105 MMOL/L (ref 98–107)
CO2 SERPL-SCNC: 25.7 MMOL/L (ref 22–29)
CREAT SERPL-MCNC: 0.74 MG/DL (ref 0.57–1)
DEPRECATED RDW RBC AUTO: 39.4 FL (ref 37–54)
EGFRCR SERPLBLD CKD-EPI 2021: 91 ML/MIN/1.73
ERYTHROCYTE [DISTWIDTH] IN BLOOD BY AUTOMATED COUNT: 12 % (ref 12.3–15.4)
GLOBULIN UR ELPH-MCNC: 2.4 GM/DL
GLUCOSE SERPL-MCNC: 87 MG/DL (ref 65–99)
HCT VFR BLD AUTO: 42.7 % (ref 34–46.6)
HGB BLD-MCNC: 14 G/DL (ref 12–15.9)
MCH RBC QN AUTO: 29.7 PG (ref 26.6–33)
MCHC RBC AUTO-ENTMCNC: 32.8 G/DL (ref 31.5–35.7)
MCV RBC AUTO: 90.7 FL (ref 79–97)
PLATELET # BLD AUTO: 243 10*3/MM3 (ref 140–450)
PMV BLD AUTO: 10.4 FL (ref 6–12)
POTASSIUM SERPL-SCNC: 4.3 MMOL/L (ref 3.5–5.2)
PROT SERPL-MCNC: 7.4 G/DL (ref 6–8.5)
QT INTERVAL: 391 MS
QTC INTERVAL: 440 MS
RBC # BLD AUTO: 4.71 10*6/MM3 (ref 3.77–5.28)
SODIUM SERPL-SCNC: 142 MMOL/L (ref 136–145)
WBC NRBC COR # BLD AUTO: 5.8 10*3/MM3 (ref 3.4–10.8)

## 2024-01-29 PROCEDURE — 93010 ELECTROCARDIOGRAM REPORT: CPT | Performed by: INTERNAL MEDICINE

## 2024-01-29 PROCEDURE — 36415 COLL VENOUS BLD VENIPUNCTURE: CPT

## 2024-01-29 PROCEDURE — 93005 ELECTROCARDIOGRAM TRACING: CPT | Performed by: ORTHOPAEDIC SURGERY

## 2024-01-29 PROCEDURE — 85027 COMPLETE CBC AUTOMATED: CPT

## 2024-01-29 PROCEDURE — 80053 COMPREHEN METABOLIC PANEL: CPT

## 2024-08-12 ENCOUNTER — TRANSCRIBE ORDERS (OUTPATIENT)
Dept: ADMINISTRATIVE | Facility: HOSPITAL | Age: 64
End: 2024-08-12
Payer: COMMERCIAL

## 2024-08-12 DIAGNOSIS — Z12.31 BREAST CANCER SCREENING BY MAMMOGRAM: Primary | ICD-10-CM

## 2024-10-07 ENCOUNTER — HOSPITAL ENCOUNTER (OUTPATIENT)
Dept: MAMMOGRAPHY | Facility: HOSPITAL | Age: 64
Discharge: HOME | End: 2024-10-07
Admitting: INTERNAL MEDICINE
Payer: COMMERCIAL

## 2024-10-07 DIAGNOSIS — Z12.31 BREAST CANCER SCREENING BY MAMMOGRAM: ICD-10-CM

## 2024-10-07 PROCEDURE — 77063 BREAST TOMOSYNTHESIS BI: CPT

## 2024-10-07 PROCEDURE — 77067 SCR MAMMO BI INCL CAD: CPT

## 2025-02-18 ENCOUNTER — TELEPHONE (OUTPATIENT)
Dept: INTERNAL MEDICINE | Facility: CLINIC | Age: 65
End: 2025-02-18

## 2025-02-18 NOTE — TELEPHONE ENCOUNTER
Caller: Terri Grant    Relationship: Self    Best call back number: [phone]     What is the best time to reach you: 3 PM AND OKAY TO LEAVE VOICEMAIL    Who are you requesting to speak with (clinical staff, provider,  specific staff member):         What was the call regarding: PATIENT JUST NEEDED TO KNOW IF SHE HAS HAD A TETANUS SHOT?     IS THERE ANYWAY THAT A PRESCRIPTION CAN BE WROTE FOR HER TO HAVE RSV?

## 2025-02-18 NOTE — TELEPHONE ENCOUNTER
Patient is not established with Dr. Armijo yet. She also does not meet the criteria for an RSV shot. And she has no record of receiving a TDAP shot in her chart. We can give her a TDAP injection at her Est. Care visit with Dr. Armijo in March.

## 2025-03-12 ENCOUNTER — OFFICE VISIT (OUTPATIENT)
Dept: INTERNAL MEDICINE | Facility: CLINIC | Age: 65
End: 2025-03-12
Payer: COMMERCIAL

## 2025-03-12 VITALS
DIASTOLIC BLOOD PRESSURE: 75 MMHG | HEIGHT: 65 IN | HEART RATE: 77 BPM | WEIGHT: 125.4 LBS | SYSTOLIC BLOOD PRESSURE: 149 MMHG | OXYGEN SATURATION: 98 % | BODY MASS INDEX: 20.89 KG/M2

## 2025-03-12 DIAGNOSIS — Z23 NEED FOR VACCINATION: ICD-10-CM

## 2025-03-12 DIAGNOSIS — Z11.59 ENCOUNTER FOR HEPATITIS C SCREENING TEST FOR LOW RISK PATIENT: ICD-10-CM

## 2025-03-12 DIAGNOSIS — Z00.00 ANNUAL PHYSICAL EXAM: ICD-10-CM

## 2025-03-12 DIAGNOSIS — F32.4 MAJOR DEPRESSIVE DISORDER WITH SINGLE EPISODE, IN PARTIAL REMISSION: Primary | ICD-10-CM

## 2025-03-12 DIAGNOSIS — G56.82 SUPRASCAPULAR ENTRAPMENT NEUROPATHY OF LEFT SIDE: ICD-10-CM

## 2025-03-12 DIAGNOSIS — E78.00 ELEVATED LDL CHOLESTEROL LEVEL: ICD-10-CM

## 2025-03-12 DIAGNOSIS — K21.9 GASTROESOPHAGEAL REFLUX DISEASE, UNSPECIFIED WHETHER ESOPHAGITIS PRESENT: ICD-10-CM

## 2025-03-12 DIAGNOSIS — R03.0 ELEVATED BLOOD PRESSURE READING IN OFFICE WITHOUT DIAGNOSIS OF HYPERTENSION: ICD-10-CM

## 2025-03-12 RX ORDER — ESCITALOPRAM OXALATE 5 MG/1
5 TABLET ORAL DAILY
COMMUNITY

## 2025-03-12 RX ORDER — FEXOFENADINE HCL 60 MG/1
60 TABLET, FILM COATED ORAL DAILY
COMMUNITY

## 2025-03-12 RX ORDER — MULTIVITAMIN WITH IRON
500 TABLET ORAL DAILY
COMMUNITY

## 2025-03-12 RX ORDER — PANTOPRAZOLE SODIUM 40 MG/1
40 TABLET, DELAYED RELEASE ORAL DAILY
COMMUNITY

## 2025-03-12 NOTE — ASSESSMENT & PLAN NOTE
- Chronic pain flares up every 2-3 weeks  - Manages with physical therapy, massage pillow, and Thera cane  - Takes half a tablet of hydrocodone for severe pain (8/10)  - Uses Tylenol or Advil for less severe pain

## 2025-03-12 NOTE — PROGRESS NOTES
"  Alberto Armijo M.D.  Internal Medicine  [facility]  [address], [address]  [place] [address]  [phone]      Chief Complaint  Establish Care and Med Refill    SUBJECTIVE    History of Present Illness    Terri Grant is a 64 y.o. female who presents to the office today as a new patient to establish care.     History of Present Illness  The patient is a 64-year-old female who is here to establish care. She has been looking for a new primary care doctor since November after Dr. Hewitt left.     She leads a healthy lifestyle, eating right and exercising regularly, including getting at least 10,000 steps a day through her work in  and [employer]. She is planning to retire soon.     She has had \"borderline cholesterol\" in the past, which improved with changes to her diet. She gets annual EKGs, blood work, mammograms at [facility], [place], operated by [facility], and colonoscopies with Dr. Lopez. Her last colonoscopy was in 2022, and the next one is planned for 2027. She also goes to the dentist every 6 to 8 months.     She needs a Tdap vaccine, as her last tetanus shot was about 10 years ago. She has completed her COVID-19 vaccination series and gets a flu shot every September or October. She had the flu a few months ago, confirmed by a nasal swab, but her symptoms were mild because she was vaccinated. She is not eligible for the RSV vaccine.    She stopped getting Pap smears two years ago after a bad experience, although she has never had an abnormal result. She thinks her blood pressure is high today because she is anxious, as it is usually not high. She has a home blood pressure monitor and plans to switch to decaf coffee to reduce jitteriness. She will be switching to Medicare in June.    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 She manages it with physical therapy, a massage pillow, and a Thera cane. She " rates her pain as 8 out of 10 when it flares up. She takes half a hydrocodone tablet, prescribed by her pain management specialist, when the pain is too much. For less severe pain, she takes Tylenol or Advil. She had surgery on her shoulder years ago and has had multiple injections and physical therapy.    She sees Dr. Nesbitt for skin issues, including 4 precancerous actinic keratoses treated with chemo cream. She follows up with her dermatologist annually and has had suspicious spots frozen off. She has never had any major skin issues.    She had right knee surgery on February 14, 2024, for a large meniscus tear from hiking. She was told she has arthritis in her knee, which is normal. She continues to enjoy hiking and wears a brace sometimes. She also uses ice on her knee after a lot of walking.    She used to take pantoprazole for acid reflux but now only needs Tums occasionally.    She started taking Lexapro 10 mg in May 2024 for depression and anxiety due to family trauma. She is currently weaning off it, down to 5 mg, and plans to stop completely by May or June 2025. She has had side effects from Lexapro, like feeling numb, and is thinking about switching to Zoloft if needed.    She had successful bunion surgery years ago.    She is a never smoker. She drinks about 3 alcoholic drinks a week but is transitioning to nonalcoholic drinks. She is planning to retire soon.    Her sister has depression. Her mother had COPD, emphysema, high blood pressure, heart disease, and experienced 2 strokes before passing away. Her father had cardiomyopathy and passed away years ago.        Review of Systems    Allergies   Allergen Reactions    Augmentin [Amoxicillin-Pot Clavulanate] Nausea And Vomiting        Outpatient Medications Marked as Taking for the 3/12/25 encounter (Office Visit) with Alberto Armijo MD   Medication Sig Dispense Refill    acetaminophen (TYLENOL) 500 MG tablet Take 2 tablets by mouth As Needed for Mild Pain.       cholecalciferol (VITAMIN D3) 10 MCG (400 UNIT) tablet Take 1 tablet by mouth Daily.      COLLAGEN PO Take  by mouth. 1 scoop collagen peptide powder once a day      cyclobenzaprine (FLEXERIL) 10 MG tablet Take 1 tablet by mouth 2 (Two) Times a Day As Needed. (uses twice a week)      escitalopram (LEXAPRO) 5 MG tablet Take 1 tablet by mouth Daily.      fexofenadine (ALLEGRA) 60 MG tablet Take 1 tablet by mouth Daily.      HYDROcodone-acetaminophen (NORCO) 7.5-325 MG per tablet Take 1-2 tablets by mouth Every 4 (Four) Hours As Needed for Moderate Pain  (Pain). 60 tablet 0    Multiple Vitamins-Minerals (AIRBORNE GUMMIES) chewable tablet Chew 1 tablet Daily.      pantoprazole (PROTONIX) 40 MG EC tablet Take 1 tablet by mouth Daily.      vitamin B-12 (CYANOCOBALAMIN) 500 MCG tablet Take 1 tablet by mouth Daily.      vitamin C (ASCORBIC ACID) 500 MG tablet Take 1 tablet by mouth Daily.          Past Medical History:   Diagnosis Date    Abdominal pain, epigastric     Acid reflux     Arthritis     osteoarthritis gets cortisone injections yearly    Bone spur     Bronchitis     Cervical disc disorder     Colon polyps     Environmental and seasonal allergies     Nausea     Neck pain     Pancreatitis     Seasonal allergies     Suprascapular entrapment neuropathy of left side      Past Surgical History:   Procedure Laterality Date    BUNIONECTOMY Right     CHOLECYSTECTOMY      COLONOSCOPY  3 YEARS AGO    COLONOSCOPY N/A 03/09/2016    Procedure: COLONOSCOPYwith hot polypectomy times 1, cold biopsy polypectomy times 3;  Surgeon: Betty Lopez MD;  Location: [facility] ENDOSCOPY;  Service:     COLONOSCOPY N/A 05/03/2022    Procedure: COLONOSCOPY INTO CECUM & TERMINAL ILEUM WITH COLD BIOPSY POLYPECTOMY;  Surgeon: Betty Lopez MD;  Location: [facility] ENDOSCOPY;  Service: Gastroenterology;  Laterality: N/A;  PRE: PERSONAL H/O COLON POLYPS  POST: COLON POLYP, DIVERTICULOSIS, HEMORRHOIDS    ELBOW COLLATERAL LIGAMENT  "RECONSTRUCTION      EXCISION BENIGN SKIN LESION SCALP / NECK / HANDS / FEET / GENITALIA      GUM SURGERY      HX OVARIAN CYSTECTOMY      KNEE CARTILAGE SURGERY Right     Meniscus tear    MOUTH SURGERY      OTHER SURGICAL HISTORY      destruction of benign lesion    ULNAR NERVE TRANSPOSITION N/A 05/29/2019    Procedure: LEFT SUPRASCAPULAR NERVE REBUILD;  Surgeon: Shane Ziegler MD;  Location: [facility] OR;  Service: Neurosurgery     Family History   Problem Relation Age of Onset    Stroke Mother     Heart disease Mother     COPD Mother     Kidney disease Mother     Hypertension Mother     Emphysema Mother     Diabetes Father     Heart disease Father         Cardiomyopathy    Depression Sister     Breast cancer Maternal Aunt     Diabetes Other         Unspecified Aunt    Cancer Other         Unspecified Aunt    Heart disease Other         Unspecified Aunt, Grandmother and Grandfather    Malig Hyperthermia Neg Hx     reports that she has never smoked. She has never used smokeless tobacco. She reports current alcohol use of about 3.0 standard drinks of alcohol per week. She reports that she does not use drugs.    OBJECTIVE    Vital Signs:   /75   Pulse 77   Ht 165.1 cm (65\")   Wt 56.9 kg (125 lb 6.4 oz)   SpO2 98%   BMI 20.87 kg/m²     Physical Exam  Constitutional:       Appearance: Normal appearance.   Cardiovascular:      Rate and Rhythm: Normal rate and regular rhythm.      Heart sounds: Normal heart sounds. No murmur heard.  Pulmonary:      Effort: Pulmonary effort is normal.      Breath sounds: Normal breath sounds.   Abdominal:      General: Abdomen is flat. There is no distension.      Palpations: Abdomen is soft.      Tenderness: There is no abdominal tenderness.   Skin:     General: Skin is warm and dry.   Neurological:      Mental Status: She is alert.   Psychiatric:         Mood and Affect: Mood normal.         Behavior: Behavior normal.         Thought Content: Thought content normal. "          Physical Exam      The following data was reviewed by: Alberto Armijo MD on 03/12/2025:            Data reviewed : previous PCP notes    - LDL cholesterol: 102  - Hemoglobin A1c: 5.1           ASSESSMENT & PLAN        Major depressive disorder with single episode, in partial remission  - Tapering off Lexapro, reducing from 10 mg to 5 mg, with plans to discontinue by May or June  - Reports emotional numbness as a side effect  - Considering Zoloft if symptoms recur after discontinuation since her sister is on Zoloft without issues         Suprascapular entrapment neuropathy of left side  - Chronic pain flares up every 2-3 weeks  - Manages with physical therapy, massage pillow, and Thera cane  - Takes half a tablet of hydrocodone for severe pain (8/10)  - Uses Tylenol or Advil for less severe pain        Elevated LDL cholesterol level  Managing with diet  Orders:    Lipid Panel    Encounter for hepatitis C screening test for low risk patient    Orders:    HCV Antibody Rfx To Qnt PCR    Need for vaccination  Requesting Tdap for new grandbaby expected in April  Orders:    Tdap Vaccine Greater Than or Equal To 6yo IM    Pneumococcal Conjugate Vaccine 20-Valent All    Gastroesophageal reflux disease, unspecified whether esophagitis present  - Previously on pantoprazole, now only requires Tums occasionally        Elevated blood pressure reading in office without diagnosis of hypertension  Patient was asked to check their BP 3-5 times weekly at various times of day after being seated for 5 minutes or longer. Discussed that goal blood pressure is less than 130 systolic and less than 80 diastolic. Asked patient to bring log to next visit.                Assessment & Plan        Health Maintenance Due   Topic Date Due    HEPATITIS C SCREENING  Never done    ANNUAL PHYSICAL  Never done    PAP SMEAR  10/07/2023        Follow Up  Return in about 10 weeks (around 5/21/2025) for Annual physical.    Patient/family had no  further questions at this time and verbalized understanding of the plan discussed today.     Patient or patient representative verbalized consent for the use of Ambient Listening during the visit with  Alberto Armijo MD for chart documentation. 3/12/2025  08:32 EDT

## 2025-03-12 NOTE — LETTER
[facility]  Vaccine Consent Form    Patient Name:  Terri Grant  Patient :  1960     Vaccine(s) Ordered    Tdap Vaccine Greater Than or Equal To 6yo IM  Pneumococcal Conjugate Vaccine 20-Valent All        Screening Checklist  The following questions should be completed prior to vaccination. If you answer “yes” to any question, it does not necessarily mean you should not be vaccinated. It just means we may need to clarify or ask more questions. If a question is unclear, please ask your healthcare provider to explain it.    Yes No   Any fever or moderate to severe illness today (mild illness and/or antibiotic treatment are not contraindications)?     Do you have a history of a serious reaction to any previous vaccinations, such as anaphylaxis, encephalopathy within 7 days, Guillain-Eden syndrome within 6 weeks, seizure?     Have you received any live vaccine(s) (e.g MMR, MIKAELA) or any other vaccines in the last month (to ensure duplicate doses aren't given)?     Do you have an anaphylactic allergy to latex (DTaP, DTaP-IPV, Hep A, Hep B, MenB, RV, Td, Tdap), baker’s yeast (Hep B, HPV), polysorbates (RSV, nirsevimab, PCV 20, Rotavirrus, Tdap, Shingrix), or gelatin (MIKAELA, MMR)?     Do you have an anaphylactic allergy to neomycin (Rabies, MIKAELA, MMR, IPV, Hep A), polymyxin B (IPV), or streptomycin (IPV)?      Any cancer, leukemia, AIDS, or other immune system disorder? (MIKAELA, MMR, RV)     Do you have a parent, brother, or sister with an immune system problem (if immune competence of vaccine recipient clinically verified, can proceed)? (MMR, MIKAELA)     Any recent steroid treatments for >2 weeks, chemotherapy, or radiation treatment? (MIKAELA, MMR)     Have you received antibody-containing blood transfusions or IVIG in the past 11 months (recommended interval is dependent on product)? (MMR, MIKAELA)     Have you taken antiviral drugs (acyclovir, famciclovir, valacyclovir for MIKAELA) in the last 24 or 48 hours,  "respectively?      Are you pregnant or planning to become pregnant within 1 month? (MIKAELA, MMR, HPV, IPV, MenB, Abrexvy; For Hep B- refer to Engerix-B; For RSV - Abrysvo is indicated for 32-36 weeks of pregnancy from September to January)     For infants, have you ever been told your child has had intussusception or a medical emergency involving obstruction of the intestine (Rotavirus)? If not for an infant, can skip this question.         *Ordering Physicians/APC should be consulted if \"yes\" is checked by the patient or guardian above.  I have received, read, and understand the Vaccine Information Statement (VIS) for each vaccine ordered.  I have considered my or my child's health status as well as the health status of my close contacts.  I have taken the opportunity to discuss my vaccine questions with my or my child's health care provider.   I have requested that the ordered vaccine(s) be given to me or my child.  I understand the benefits and risks of the vaccines.  I understand that I should remain in the clinic for 15 minutes after receiving the vaccine(s).  _________________________________________________________  Signature of Patient or Parent/Legal Guardian ____________________  Date     "

## 2025-04-03 ENCOUNTER — OFFICE VISIT (OUTPATIENT)
Dept: INTERNAL MEDICINE | Facility: CLINIC | Age: 65
End: 2025-04-03
Payer: COMMERCIAL

## 2025-04-03 VITALS
OXYGEN SATURATION: 98 % | DIASTOLIC BLOOD PRESSURE: 78 MMHG | HEIGHT: 65 IN | WEIGHT: 126.4 LBS | BODY MASS INDEX: 21.06 KG/M2 | SYSTOLIC BLOOD PRESSURE: 157 MMHG | HEART RATE: 86 BPM

## 2025-04-03 DIAGNOSIS — R03.0 ELEVATED BLOOD PRESSURE READING IN OFFICE WITHOUT DIAGNOSIS OF HYPERTENSION: ICD-10-CM

## 2025-04-03 DIAGNOSIS — J40 BRONCHITIS: Primary | ICD-10-CM

## 2025-04-03 PROCEDURE — 99213 OFFICE O/P EST LOW 20 MIN: CPT | Performed by: STUDENT IN AN ORGANIZED HEALTH CARE EDUCATION/TRAINING PROGRAM

## 2025-04-03 RX ORDER — BENZONATATE 200 MG/1
200 CAPSULE ORAL 3 TIMES DAILY PRN
Qty: 30 CAPSULE | Refills: 2 | Status: SHIPPED | OUTPATIENT
Start: 2025-04-03

## 2025-04-03 RX ORDER — IPRATROPIUM BROMIDE 42 UG/1
2 SPRAY, METERED NASAL 4 TIMES DAILY
Qty: 15 ML | Refills: 2 | Status: SHIPPED | OUTPATIENT
Start: 2025-04-03

## 2025-04-03 RX ORDER — AZITHROMYCIN 250 MG/1
TABLET, FILM COATED ORAL
Qty: 6 TABLET | Refills: 0 | Status: SHIPPED | OUTPATIENT
Start: 2025-04-03

## 2025-04-03 NOTE — PROGRESS NOTES
Alberto Armijo M.D.  Internal Medicine  [facility]  [address], [address]  [place] [address]  [phone]      Chief Complaint  Earache (Right ear pain some better /), Nasal Congestion, and Cough (Cough x 2 weeks deep cough /)    SUBJECTIVE    History of Present Illness    Terri Grant is a 65 y.o. female with past medical history significant for depression, hyperlipidemia, GERD who presents to the office today as an established patient that last saw me on 3/12/2025.     History of Present Illness  The patient came in today because she's been struggling with an upper respiratory infection and her blood pressure has been high. She mentioned that her symptoms started about 2 weeks ago and she initially thought it was just allergies. She was sneezing a lot, had a runny nose, bad headaches in the morning, earache, jaw pain, and thick yellow mucus from her sinuses, which she believed was a sinus infection. These symptoms cleared up about 3 days ago, but she still has allergy-like symptoms, including a persistent cough with clear mucus, throat pain when coughing, and a burning sensation. She hasn't had any fevers, chills, shortness of breath, chest pain, or wheezing. She's tried various medications like Nasacort, Flonase, Stay-Hist, Mucinex, Tessalon Perles, Allegra, Zyrtec, Delsym, Robitussin, and NyQuil, but with mixed results. She usually gets bronchitis in the spring and fall. She received her DTaP vaccine and is allergic to AUGMENTIN. In the past, she was treated with Z-Aravind.    Regarding her blood pressure, she's been keeping track of it at home using two different cuffs. One cuff consistently shows high readings, while the other shows normal or low readings. Her blood pressure was 153/82 before coming in today, but at home, it was 121/72. She thinks her high blood pressure might be due to anxiety about her son's impending childbirth and some family issues. She plans to bring her  blood pressure log for review in May.        Review of Systems    Allergies   Allergen Reactions    Augmentin [Amoxicillin-Pot Clavulanate] Nausea And Vomiting        Outpatient Medications Marked as Taking for the 4/3/25 encounter (Office Visit) with Alberto Armijo MD   Medication Sig Dispense Refill    acetaminophen (TYLENOL) 500 MG tablet Take 2 tablets by mouth As Needed for Mild Pain.      cholecalciferol (VITAMIN D3) 10 MCG (400 UNIT) tablet Take 1 tablet by mouth Daily.      COLLAGEN PO Take  by mouth. 1 scoop collagen peptide powder once a day      cyclobenzaprine (FLEXERIL) 10 MG tablet Take 1 tablet by mouth 2 (Two) Times a Day As Needed. (uses twice a week)      escitalopram (LEXAPRO) 5 MG tablet Take 1 tablet by mouth Daily.      fexofenadine (ALLEGRA) 60 MG tablet Take 1 tablet by mouth Daily.      HYDROcodone-acetaminophen (NORCO) 7.5-325 MG per tablet Take 1-2 tablets by mouth Every 4 (Four) Hours As Needed for Moderate Pain  (Pain). 60 tablet 0    Multiple Vitamins-Minerals (AIRBORNE GUMMIES) chewable tablet Chew 1 tablet Daily.      pantoprazole (PROTONIX) 40 MG EC tablet Take 1 tablet by mouth Daily.      vitamin B-12 (CYANOCOBALAMIN) 500 MCG tablet Take 1 tablet by mouth Daily.      vitamin C (ASCORBIC ACID) 500 MG tablet Take 1 tablet by mouth Daily.          Past Medical History:   Diagnosis Date    Abdominal pain, epigastric     Acid reflux     Arthritis     osteoarthritis gets cortisone injections yearly    Bone spur     Bronchitis     Cervical disc disorder     Colon polyps     Environmental and seasonal allergies     Nausea     Neck pain     Pancreatitis     Seasonal allergies     Suprascapular entrapment neuropathy of left side      Past Surgical History:   Procedure Laterality Date    BUNIONECTOMY Right     CHOLECYSTECTOMY      COLONOSCOPY  3 YEARS AGO    COLONOSCOPY N/A 03/09/2016    Procedure: COLONOSCOPYwith hot polypectomy times 1, cold biopsy polypectomy times 3;  Surgeon: Betty RAO  "MD Jessica;  Location: [facility] ENDOSCOPY;  Service:     COLONOSCOPY N/A 05/03/2022    Procedure: COLONOSCOPY INTO CECUM & TERMINAL ILEUM WITH COLD BIOPSY POLYPECTOMY;  Surgeon: Betty Lopez MD;  Location: [facility] ENDOSCOPY;  Service: Gastroenterology;  Laterality: N/A;  PRE: PERSONAL H/O COLON POLYPS  POST: COLON POLYP, DIVERTICULOSIS, HEMORRHOIDS    ELBOW COLLATERAL LIGAMENT RECONSTRUCTION      EXCISION BENIGN SKIN LESION SCALP / NECK / HANDS / FEET / GENITALIA      GUM SURGERY      HX OVARIAN CYSTECTOMY      KNEE CARTILAGE SURGERY Right     Meniscus tear    MOUTH SURGERY      OTHER SURGICAL HISTORY      destruction of benign lesion    ULNAR NERVE TRANSPOSITION N/A 05/29/2019    Procedure: LEFT SUPRASCAPULAR NERVE REBUILD;  Surgeon: Shane Ziegler MD;  Location: [facility] MAIN OR;  Service: Neurosurgery     Family History   Problem Relation Age of Onset    Stroke Mother     Heart disease Mother     COPD Mother     Kidney disease Mother     Hypertension Mother     Emphysema Mother     Diabetes Father     Heart disease Father         Cardiomyopathy    Depression Sister     Breast cancer Maternal Aunt     Diabetes Other         Unspecified Aunt    Cancer Other         Unspecified Aunt    Heart disease Other         Unspecified Aunt, Grandmother and Grandfather    Malig Hyperthermia Neg Hx     reports that she has never smoked. She has never used smokeless tobacco. She reports current alcohol use of about 3.0 standard drinks of alcohol per week. She reports that she does not use drugs.    OBJECTIVE    Vital Signs:   /78   Pulse 86   Ht 165.1 cm (65\")   Wt 57.3 kg (126 lb 6.4 oz)   SpO2 98%   BMI 21.03 kg/m²     Physical Exam  Constitutional:       Appearance: Normal appearance.   HENT:      Right Ear: Tympanic membrane normal.      Left Ear: Tympanic membrane normal.      Mouth/Throat:      Pharynx: Posterior oropharyngeal erythema present.   Cardiovascular:      Rate and Rhythm: Normal rate and " regular rhythm.      Heart sounds: Normal heart sounds. No murmur heard.  Pulmonary:      Effort: Pulmonary effort is normal.      Breath sounds: Normal breath sounds.      Comments: Dry cough  Musculoskeletal:      Right lower leg: No edema.      Left lower leg: No edema.   Skin:     General: Skin is warm and dry.   Neurological:      Mental Status: She is alert.   Psychiatric:         Behavior: Behavior normal.          Physical Exam      The following data was reviewed by: Alberto Armijo MD on 04/03/2025:                      ASSESSMENT & PLAN        Bronchitis  - for 2 weeks. Allergic to pollen and dust. Reated with shots in past. Now takes Zyrtecs/ sneezing and dripping. No with sinus, ear and jaw pain. Yellow discharge now improved. Coughing clear mucous. Chest Burns. Taking Delsym, Robitussin, Nyquil. Cough keeps her awake.Gets bronchitis twice/year treated with z-pack. No fevers/chill. No shortness of breath. No CP or wheezing. Tried nasacort and flonase. Tried Stayhist and mucinex.   - Symptoms suggest potential bronchitis  - Recommend over-the-counter saline nasal spray, hydration, and cough suppressants  - For immediate relief, Benadryl or NyQuil tonight  - If cough persists for consider chest x-ray  - Patient counseled to seek medical care for new or worsening symptoms or failure of symptoms to improve.     Orders:    benzonatate (TESSALON) 200 MG capsule; Take 1 capsule by mouth 3 (Three) Times a Day As Needed for Cough.    ipratropium (ATROVENT) 0.06 % nasal spray; Administer 2 sprays into the nostril(s) as directed by provider 4 (Four) Times a Day.    saline 0.65 % nasal solution (BABY AYR) 0.65 % solution; Administer 0.1 sprays into the nostril(s) as directed by provider As Needed (congestion).    azithromycin (Zithromax Z-Aravind) 250 MG tablet; Take 2 tablets the first day, then 1 tablet daily for 4 days.    Elevated blood pressure reading in office without diagnosis of hypertension  - Inconsistent home  readings attributed to anxiety and family stress  - avoid decongestants  - Continue monitoring and bring blood pressure log to next visit in May 2025          Assessment & Plan        Health Maintenance Due   Topic Date Due    HEPATITIS C SCREENING  Never done    ANNUAL PHYSICAL  Never done    DXA SCAN  05/11/2020        Follow Up  No follow-ups on file.    Patient/family had no further questions at this time and verbalized understanding of the plan discussed today.     Patient or patient representative verbalized consent for the use of Ambient Listening during the visit with  Alberto Armijo MD for chart documentation. 4/3/2025  20:39 EDT

## 2025-05-03 LAB
ALBUMIN SERPL-MCNC: 4.9 G/DL (ref 3.9–4.9)
ALP SERPL-CCNC: 85 IU/L (ref 44–121)
ALT SERPL-CCNC: 19 IU/L (ref 0–32)
AST SERPL-CCNC: 24 IU/L (ref 0–40)
BASOPHILS # BLD AUTO: 0.1 X10E3/UL (ref 0–0.2)
BASOPHILS NFR BLD AUTO: 1 %
BILIRUB SERPL-MCNC: 0.6 MG/DL (ref 0–1.2)
BUN SERPL-MCNC: 18 MG/DL (ref 8–27)
BUN/CREAT SERPL: 23 (ref 12–28)
CALCIUM SERPL-MCNC: 9.8 MG/DL (ref 8.7–10.3)
CHLORIDE SERPL-SCNC: 103 MMOL/L (ref 96–106)
CHOLEST SERPL-MCNC: 229 MG/DL (ref 100–199)
CO2 SERPL-SCNC: 26 MMOL/L (ref 20–29)
CREAT SERPL-MCNC: 0.79 MG/DL (ref 0.57–1)
EGFRCR SERPLBLD CKD-EPI 2021: 83 ML/MIN/1.73
EOSINOPHIL # BLD AUTO: 0.2 X10E3/UL (ref 0–0.4)
EOSINOPHIL NFR BLD AUTO: 3 %
ERYTHROCYTE [DISTWIDTH] IN BLOOD BY AUTOMATED COUNT: 12 % (ref 11.7–15.4)
GLOBULIN SER CALC-MCNC: 2.3 G/DL (ref 1.5–4.5)
GLUCOSE SERPL-MCNC: 87 MG/DL (ref 70–99)
HBA1C MFR BLD: 5.2 % (ref 4.8–5.6)
HCT VFR BLD AUTO: 42.8 % (ref 34–46.6)
HCV AB SERPL QL IA: NON REACTIVE
HCV AB SERPL QL IA: NORMAL
HDLC SERPL-MCNC: 60 MG/DL
HGB BLD-MCNC: 14 G/DL (ref 11.1–15.9)
IMM GRANULOCYTES # BLD AUTO: 0 X10E3/UL (ref 0–0.1)
IMM GRANULOCYTES NFR BLD AUTO: 0 %
LDLC SERPL CALC-MCNC: 146 MG/DL (ref 0–99)
LYMPHOCYTES # BLD AUTO: 1.4 X10E3/UL (ref 0.7–3.1)
LYMPHOCYTES NFR BLD AUTO: 29 %
MCH RBC QN AUTO: 30.5 PG (ref 26.6–33)
MCHC RBC AUTO-ENTMCNC: 32.7 G/DL (ref 31.5–35.7)
MCV RBC AUTO: 93 FL (ref 79–97)
MONOCYTES # BLD AUTO: 0.4 X10E3/UL (ref 0.1–0.9)
MONOCYTES NFR BLD AUTO: 9 %
NEUTROPHILS # BLD AUTO: 2.9 X10E3/UL (ref 1.4–7)
NEUTROPHILS NFR BLD AUTO: 58 %
PLATELET # BLD AUTO: 216 X10E3/UL (ref 150–450)
POTASSIUM SERPL-SCNC: 4.4 MMOL/L (ref 3.5–5.2)
PROT SERPL-MCNC: 7.2 G/DL (ref 6–8.5)
RBC # BLD AUTO: 4.59 X10E6/UL (ref 3.77–5.28)
SODIUM SERPL-SCNC: 144 MMOL/L (ref 134–144)
TRIGL SERPL-MCNC: 130 MG/DL (ref 0–149)
TSH SERPL DL<=0.005 MIU/L-ACNC: 2.34 UIU/ML (ref 0.45–4.5)
VLDLC SERPL CALC-MCNC: 23 MG/DL (ref 5–40)
WBC # BLD AUTO: 5 X10E3/UL (ref 3.4–10.8)

## 2025-05-06 ENCOUNTER — OFFICE VISIT (OUTPATIENT)
Dept: INTERNAL MEDICINE | Facility: CLINIC | Age: 65
End: 2025-05-06
Payer: COMMERCIAL

## 2025-05-06 VITALS
HEART RATE: 85 BPM | HEIGHT: 65 IN | DIASTOLIC BLOOD PRESSURE: 84 MMHG | BODY MASS INDEX: 20.99 KG/M2 | WEIGHT: 126 LBS | SYSTOLIC BLOOD PRESSURE: 142 MMHG | OXYGEN SATURATION: 98 %

## 2025-05-06 DIAGNOSIS — N30.00 ACUTE CYSTITIS WITHOUT HEMATURIA: Primary | ICD-10-CM

## 2025-05-06 PROBLEM — Z01.419 WELL WOMAN EXAM: Status: RESOLVED | Noted: 2020-10-07 | Resolved: 2025-05-06

## 2025-05-06 PROCEDURE — 99213 OFFICE O/P EST LOW 20 MIN: CPT | Performed by: INTERNAL MEDICINE

## 2025-05-06 RX ORDER — NITROFURANTOIN 25; 75 MG/1; MG/1
100 CAPSULE ORAL 2 TIMES DAILY
Qty: 14 CAPSULE | Refills: 0 | Status: SHIPPED | OUTPATIENT
Start: 2025-05-06 | End: 2025-05-13

## 2025-05-06 NOTE — PROGRESS NOTES
Subjective     Terri Grant is a 65 y.o. female who presents with   Chief Complaint   Patient presents with    Urinary Urgency       History of Present Illness     Two days of urinary symptoms.  Cramping and urgency.  No blood in urine.      Review of Systems   Gastrointestinal:  Positive for abdominal pain.   Musculoskeletal:  Positive for back pain.       The following portions of the patient's history were reviewed and updated as appropriate: allergies, current medications and problem list.    Patient Active Problem List    Diagnosis Date Noted    Hx of colonic polyp 01/21/2022     Note Last Updated: 1/21/2022     Added automatically from request for surgery [id number]      Chronic scapular pain 05/09/2018     Note Last Updated: 5/9/2018     Acute episode      Neck pain on left side 05/09/2018    Suprascapular entrapment neuropathy of left side 03/22/2016       Current Outpatient Medications on File Prior to Visit   Medication Sig Dispense Refill    acetaminophen (TYLENOL) 500 MG tablet Take 2 tablets by mouth As Needed for Mild Pain.      azithromycin (Zithromax Z-Aravind) 250 MG tablet Take 2 tablets the first day, then 1 tablet daily for 4 days. 6 tablet 0    benzonatate (TESSALON) 200 MG capsule Take 1 capsule by mouth 3 (Three) Times a Day As Needed for Cough. 30 capsule 2    cholecalciferol (VITAMIN D3) 10 MCG (400 UNIT) tablet Take 1 tablet by mouth Daily.      COLLAGEN PO Take  by mouth. 1 scoop collagen peptide powder once a day      cyclobenzaprine (FLEXERIL) 10 MG tablet Take 1 tablet by mouth 2 (Two) Times a Day As Needed. (uses twice a week)      escitalopram (LEXAPRO) 5 MG tablet Take 1 tablet by mouth Daily.      fexofenadine (ALLEGRA) 60 MG tablet Take 1 tablet by mouth Daily.      HYDROcodone-acetaminophen (NORCO) 7.5-325 MG per tablet Take 1-2 tablets by mouth Every 4 (Four) Hours As Needed for Moderate Pain  (Pain). 60 tablet 0    ipratropium (ATROVENT) 0.06 % nasal spray Administer 2 sprays  "into the nostril(s) as directed by provider 4 (Four) Times a Day. 15 mL 2    Multiple Vitamins-Minerals (AIRBORNE GUMMIES) chewable tablet Chew 1 tablet Daily.      pantoprazole (PROTONIX) 40 MG EC tablet Take 1 tablet by mouth Daily.      saline 0.65 % nasal solution (BABY AYR) 0.65 % solution Administer 0.1 sprays into the nostril(s) as directed by provider As Needed (congestion). 30 mL 2    vitamin B-12 (CYANOCOBALAMIN) 500 MCG tablet Take 1 tablet by mouth Daily.      vitamin C (ASCORBIC ACID) 500 MG tablet Take 1 tablet by mouth Daily.       No current facility-administered medications on file prior to visit.       Objective     /84   Pulse 85   Ht 165.1 cm (65\")   Wt 57.2 kg (126 lb)   LMP  (LMP Unknown)   SpO2 98%   BMI 20.97 kg/m²     Physical Exam  Constitutional:       Appearance: She is well-developed.   HENT:      Head: Normocephalic and atraumatic.   Pulmonary:      Effort: Pulmonary effort is normal.   Abdominal:      General: There is no distension.      Palpations: Abdomen is soft. There is no mass.      Tenderness: There is no abdominal tenderness. There is no guarding or rebound.   Neurological:      Mental Status: She is alert.         Assessment & Plan   Diagnoses and all orders for this visit:    1. Acute cystitis without hematuria (Primary)  -     UA / M With / Rflx Culture([facility] ONLY) - Urine, Clean Catch    Other orders  -     nitrofurantoin, macrocrystal-monohydrate, (Macrobid) 100 MG capsule; Take 1 capsule by mouth 2 (Two) Times a Day for 7 days.  Dispense: 14 capsule; Refill: 0        Discussion    Patient presents with symptoms of acute cystitis.  No dip done because she took pyridium.  We will send for culture and follow up on that.  Prescription for antibiotics is sent in.  The patient is instructed to let our office know if not feeling better over the next several days or if there is any change in symptoms.          Future Appointments   Date Time Provider Department " [place]   5/8/2025  3:15 PM Alberto Armijo MD [facility]

## 2025-05-08 ENCOUNTER — OFFICE VISIT (OUTPATIENT)
Dept: INTERNAL MEDICINE | Facility: CLINIC | Age: 65
End: 2025-05-08
Payer: COMMERCIAL

## 2025-05-08 VITALS
SYSTOLIC BLOOD PRESSURE: 140 MMHG | BODY MASS INDEX: 21.23 KG/M2 | HEART RATE: 72 BPM | DIASTOLIC BLOOD PRESSURE: 70 MMHG | WEIGHT: 127.4 LBS | HEIGHT: 65 IN | OXYGEN SATURATION: 98 %

## 2025-05-08 DIAGNOSIS — N30.00 ACUTE CYSTITIS WITHOUT HEMATURIA: ICD-10-CM

## 2025-05-08 DIAGNOSIS — I10 PRIMARY HYPERTENSION: ICD-10-CM

## 2025-05-08 DIAGNOSIS — Z78.0 POST-MENOPAUSE: ICD-10-CM

## 2025-05-08 DIAGNOSIS — F32.4 MAJOR DEPRESSIVE DISORDER WITH SINGLE EPISODE, IN PARTIAL REMISSION: ICD-10-CM

## 2025-05-08 DIAGNOSIS — M25.561 ACUTE PAIN OF RIGHT KNEE: ICD-10-CM

## 2025-05-08 DIAGNOSIS — E78.5 HYPERLIPIDEMIA, UNSPECIFIED HYPERLIPIDEMIA TYPE: ICD-10-CM

## 2025-05-08 DIAGNOSIS — Z00.00 ANNUAL PHYSICAL EXAM: Primary | ICD-10-CM

## 2025-05-08 DIAGNOSIS — Z12.31 ENCOUNTER FOR SCREENING MAMMOGRAM FOR MALIGNANT NEOPLASM OF BREAST: ICD-10-CM

## 2025-05-08 LAB
APPEARANCE UR: CLEAR
BACTERIA #/AREA URNS HPF: NORMAL /[HPF]
BACTERIA UR CULT: NO GROWTH
BACTERIA UR CULT: NORMAL
BILIRUB UR QL STRIP: NEGATIVE
CASTS URNS QL MICRO: NORMAL /LPF
COLOR UR: YELLOW
EPI CELLS #/AREA URNS HPF: NORMAL /HPF (ref 0–10)
GLUCOSE UR QL STRIP: NEGATIVE
HGB UR QL STRIP: NEGATIVE
KETONES UR QL STRIP: NEGATIVE
LEUKOCYTE ESTERASE UR QL STRIP: NEGATIVE
MICRO URNS: ABNORMAL
MUCOUS THREADS URNS QL MICRO: PRESENT
NITRITE UR QL STRIP: POSITIVE
PH UR STRIP: 6.5 [PH] (ref 5–7.5)
PROT UR QL STRIP: NEGATIVE
RBC #/AREA URNS HPF: NORMAL /HPF (ref 0–2)
SP GR UR STRIP: 1.01 (ref 1–1.03)
URINALYSIS REFLEX: ABNORMAL
UROBILINOGEN UR STRIP-MCNC: 1 MG/DL (ref 0.2–1)
WBC #/AREA URNS HPF: NORMAL /HPF (ref 0–5)

## 2025-05-08 RX ORDER — CALCIUM CARBONATE/VITAMIN D3 500-10/5ML
LIQUID (ML) ORAL
COMMUNITY

## 2025-05-08 NOTE — PROGRESS NOTES
Alberto Armijo M.D.  Internal Medicine  [facility]  [address], [address]  [place] [address]  [phone]      Chief Complaint  Annual Exam (CPE /)    SUBJECTIVE    History of Present Illness    Terri Grant is a 65 y.o. female with past medical history significant for depression, hyperlipidemia, GERD who presents to the office today as an established patient that last saw me on 4/3/2025.     History of Present Illness  She has been keeping an eye on her blood pressure at home using two different cuffs, and she noticed that the black cuff shows higher readings. Her blood pressure goes down after she exercises. She wants to try changing her lifestyle to manage her high blood pressure before starting any medication and plans to be more active after she retires on 05/23/2025. She sometimes takes Mucinex and pain medication for her knee and shoulder, which might be affecting her blood pressure. She doesn't use NSAIDs or Sudafed. Her mother had high blood pressure and suffered two strokes.    She has trouble sleeping and uses melatonin and magnesium, which seem to help.    She is slowly stopping her Lexapro, currently taking 5 mg with about 2 weeks left. She feels okay but sometimes gets teary. She is thinking about seeing a counselor because of stress related to her sisters' difficult situations. She goes to Al-Anon meetings every week, which she finds helpful.    Her recent blood work showed high cholesterol and sodium levels. Her previous PCP told her to avoid butter, sour cream, yogurt, and ice cream. She mostly eats fruits and vegetables and avoids fried foods but sometimes eats salty pizza.    She had a UTI and started feeling better after taking Macrobid, but today she feels like she needs to pee a lot. She took AZO for one day and hasn't noticed any blood in her urine.    She slipped at work and hurt her knee, which had meniscus surgery before. It feels better today after using  ice and Tylenol. She doesn't think she tore anything but will fill out worker's comp paperwork. She can walk fine.    Her dermatologist said a skin lesion is nothing to worry about and doesn't need treatment. It hasn't gotten better.    She hasn't had a DEXA scan in 2 years and will switch to Medicare in June. She doesn't see a gynecologist but gets mammograms and colonoscopies every 5 years. She hasn't had any problems in 25 to 30 years and never needed a biopsy.        Review of Systems    Allergies   Allergen Reactions    Augmentin [Amoxicillin-Pot Clavulanate] Nausea And Vomiting        Outpatient Medications Marked as Taking for the 5/8/25 encounter (Office Visit) with Alberto Armijo MD   Medication Sig Dispense Refill    acetaminophen (TYLENOL) 500 MG tablet Take 2 tablets by mouth As Needed for Mild Pain.      cholecalciferol (VITAMIN D3) 10 MCG (400 UNIT) tablet Take 1 tablet by mouth Daily.      COLLAGEN PO Take  by mouth. 1 scoop collagen peptide powder once a day      cyclobenzaprine (FLEXERIL) 10 MG tablet Take 1 tablet by mouth 2 (Two) Times a Day As Needed. (uses twice a week)      escitalopram (LEXAPRO) 5 MG tablet Take 1 tablet by mouth Daily.      fexofenadine (ALLEGRA) 60 MG tablet Take 1 tablet by mouth Daily.      HYDROcodone-acetaminophen (NORCO) 7.5-325 MG per tablet Take 1-2 tablets by mouth Every 4 (Four) Hours As Needed for Moderate Pain  (Pain). 60 tablet 0    ipratropium (ATROVENT) 0.06 % nasal spray Administer 2 sprays into the nostril(s) as directed by provider 4 (Four) Times a Day. 15 mL 2    nitrofurantoin, macrocrystal-monohydrate, (Macrobid) 100 MG capsule Take 1 capsule by mouth 2 (Two) Times a Day for 7 days. 14 capsule 0    pantoprazole (PROTONIX) 40 MG EC tablet Take 1 tablet by mouth Daily.      saline 0.65 % nasal solution (BABY AYR) 0.65 % solution Administer 0.1 sprays into the nostril(s) as directed by provider As Needed (congestion). 30 mL 2    vitamin B-12 (CYANOCOBALAMIN)  500 MCG tablet Take 1 tablet by mouth Daily.      vitamin C (ASCORBIC ACID) 500 MG tablet Take 1 tablet by mouth Daily.      Zinc 30 MG capsule Take  by mouth.          Past Medical History:   Diagnosis Date    Abdominal pain, epigastric     Acid reflux     Arthritis     osteoarthritis gets cortisone injections yearly    Bone spur     Bronchitis     Cervical disc disorder     Colon polyps     Environmental and seasonal allergies     Nausea     Neck pain     Pancreatitis     Seasonal allergies     Suprascapular entrapment neuropathy of left side      Past Surgical History:   Procedure Laterality Date    BUNIONECTOMY Right     CHOLECYSTECTOMY      COLONOSCOPY  3 YEARS AGO    COLONOSCOPY N/A 03/09/2016    Procedure: COLONOSCOPYwith hot polypectomy times 1, cold biopsy polypectomy times 3;  Surgeon: Betty Lopez MD;  Location: [facility] ENDOSCOPY;  Service:     COLONOSCOPY N/A 05/03/2022    Procedure: COLONOSCOPY INTO CECUM & TERMINAL ILEUM WITH COLD BIOPSY POLYPECTOMY;  Surgeon: Betty Lopez MD;  Location: [facility] ENDOSCOPY;  Service: Gastroenterology;  Laterality: N/A;  PRE: PERSONAL H/O COLON POLYPS  POST: COLON POLYP, DIVERTICULOSIS, HEMORRHOIDS    ELBOW COLLATERAL LIGAMENT RECONSTRUCTION      EXCISION BENIGN SKIN LESION SCALP / NECK / HANDS / FEET / GENITALIA      GUM SURGERY      HX OVARIAN CYSTECTOMY      KNEE CARTILAGE SURGERY Right     Meniscus tear    MOUTH SURGERY      OTHER SURGICAL HISTORY      destruction of benign lesion    ULNAR NERVE TRANSPOSITION N/A 05/29/2019    Procedure: LEFT SUPRASCAPULAR NERVE REBUILD;  Surgeon: Shane Ziegler MD;  Location: [facility] OR;  Service: Neurosurgery     Family History   Problem Relation Age of Onset    Stroke Mother     Heart disease Mother     COPD Mother     Kidney disease Mother     Hypertension Mother     Emphysema Mother     Diabetes Father     Heart disease Father         Cardiomyopathy    Depression Sister     Breast cancer Maternal Aunt      "Diabetes Other         Unspecified Aunt    Cancer Other         Unspecified Aunt    Heart disease Other         Unspecified Aunt, Grandmother and Grandfather    Malig Hyperthermia Neg Hx     reports that she has never smoked. She has never been exposed to tobacco smoke. She has never used smokeless tobacco. She reports current alcohol use of about 3.0 standard drinks of alcohol per week. She reports that she does not use drugs.    OBJECTIVE    Vital Signs:   /70   Pulse 72   Ht 165.1 cm (65\")   Wt 57.8 kg (127 lb 6.4 oz)   SpO2 98%   BMI 21.20 kg/m²     Physical Exam  Constitutional:       Appearance: Normal appearance.   HENT:      Right Ear: Tympanic membrane normal.      Left Ear: Tympanic membrane normal.   Cardiovascular:      Rate and Rhythm: Normal rate and regular rhythm.      Heart sounds: Normal heart sounds. No murmur heard.  Pulmonary:      Effort: Pulmonary effort is normal.      Breath sounds: Normal breath sounds.   Abdominal:      General: Abdomen is flat. There is no distension.      Palpations: Abdomen is soft.      Tenderness: There is no abdominal tenderness.   Skin:     General: Skin is warm and dry.   Neurological:      Mental Status: She is alert.   Psychiatric:         Mood and Affect: Mood normal.         Behavior: Behavior normal.         Thought Content: Thought content normal.          Physical Exam      The following data was reviewed by: Alberto Armijo MD on 05/08/2025:  CMP          5/2/2025    08:35   CMP   Glucose 87    BUN 18    Creatinine 0.79    EGFR 83    Sodium 144    Potassium 4.4    Chloride 103    Calcium 9.8    Total Protein 7.2    Albumin 4.9    Globulin 2.3    Total Bilirubin 0.6    Alkaline Phosphatase 85    AST (SGOT) 24    ALT (SGPT) 19    BUN/Creatinine Ratio 23      CBC w/diff          5/2/2025    08:35   CBC w/Diff   WBC 5.0    RBC 4.59    Hemoglobin 14.0    Hematocrit 42.8    MCV 93    MCH 30.5    MCHC 32.7    RDW 12.0    Platelets 216    Neutrophil Rel % 58 "    Lymphocyte Rel % 29    Monocyte Rel % 9    Eosinophil Rel % 3    Basophil Rel % 1      Lipid Panel          5/2/2025    08:35   Lipid Panel   Total Cholesterol 229    Triglycerides 130    HDL Cholesterol 60    VLDL Cholesterol 23    LDL Cholesterol  146      TSH          5/2/2025    08:35   TSH   TSH 2.340      A1C Last 3 Results          5/2/2025    08:35   HGBA1C Last 3 Results   Hemoglobin A1C 5.2                ASSESSMENT & PLAN        Annual physical exam  -Age and sex appropriate physical exam performed and documented. Updated past medical, family, social and surgical histories as well as allergies and care team list. Addressed care gaps listed in the medical record.  -Encouraged annual dental and vision exams as part of their overall health.  -Encouraged minimum of 30 minutes or more of exercise at a brisk walk or higher 5 days per week combined with a well-balanced diet.  -Immunizations reviewed and updated in EMR.  -Lipid screening:   The 10-year ASCVD risk score (Delia CALL, et al., 2019) is: 6.8%    Values used to calculate the score:      Age: 65 years      Sex: Female      Is Non- : No      Diabetic: No      Tobacco smoker: No      Systolic Blood Pressure: 140 mmHg      Is BP treated: No      HDL Cholesterol: 60 mg/dL      Total Cholesterol: 229 mg/dL   -Aspirin for primary or secondary prevention: Not applicable, patient is greater than age 60 and risks outweigh benefits for primary prevention.  -Depression screening: PHQ2 performed and the patient's screen was negative.  -Diabetes screening:  negative screen  -Hypertension screening: she has hypertension  -Colon cancer screening: last done 2022 with 5 year recall  -Lung cancer screening: Patient has never smoked.  -Cervical cancer screening:  She stopped getting Pap smears two years ago after a bad experience, although she has never had an abnormal result.  -Breast cancer screening: Mammogram in October No mammographic  evidence of malignancy. Recommend annual screening mammogram in one year. Discussed Breast MRI and she declines  -Osteoporosis screening: Informed patient that the USPSTF recommends screening for osteoporosis with bone measurement testing to prevent osteoporotic fractures in women 65 years and older.        Major depressive disorder with single episode, in partial remission  Patient's depression is a single episode that is mild without psychosis. Depression is in partial remission and stable. Weaning from Lexapro. Teary at times. Stress in family. She wants to continue to wean.  - Long-term trouble sleeping, taking melatonin and magnesium, which help  - Continue supplements and engage in relaxing activities before bed   Plan:   Continue current medication therapy          Primary hypertension  - Patient was asked to check their BP 3-5 times weekly at various times of day after being seated for 5 minutes or longer. Discussed that goal blood pressure is less than 130 systolic and less than 80 diastolic. Asked patient to bring log to next visit.   - Bring home blood pressure monitor to next appointment for calibration  - Recommended DASH diet and provided information  - Continue monitoring blood pressure at home and maintain log  - I think she is going to need an antihypertensive. She already eats a healthy diet and is a healthy weight. She also exercises. Advised patient of this.        Acute cystitis without hematuria  - UA was nitrite positive, Culture negative  - Symptoms of UTI, prescribed Macrobid for 3 days  - Culture negative; initial improvement but now frequent urination  - Continue Macrobid, take AZO for 2 days if symptoms persist  - Conduct another urinalysis if no improvement        Acute pain of right knee  Slipped. Wearing brace. Tore meniscus in past. Trying rest ance. Getting better already.        Hyperlipidemia, unspecified hyperlipidemia type   Lipid abnormalities are stable  Try decreasing high fat  and greasy foods, increasing exercise to a total of 150 minutes weekly at a brisk walk or more, and increasing vegetables.          Post-menopause    Orders:    DEXA Bone Density Axial    Encounter for screening mammogram for malignant neoplasm of breast    Orders:    Mammo Screening Digital Tomosynthesis Bilateral With CAD; Future      Assessment & Plan        Health Maintenance Due   Topic Date Due    ANNUAL PHYSICAL  Never done    DXA SCAN  05/11/2020    COVID-19 Vaccine (8 - 2024-25 season) 04/11/2025        Follow Up  Return in about 4 weeks (around 6/5/2025) for Recheck.    Patient/family had no further questions at this time and verbalized understanding of the plan discussed today.     Patient or patient representative verbalized consent for the use of Ambient Listening during the visit with  Alberto Armijo MD for chart documentation. 5/8/2025  20:49 EDT

## 2025-05-13 ENCOUNTER — OFFICE VISIT (OUTPATIENT)
Dept: INTERNAL MEDICINE | Facility: CLINIC | Age: 65
End: 2025-05-13
Payer: COMMERCIAL

## 2025-05-13 VITALS
SYSTOLIC BLOOD PRESSURE: 120 MMHG | DIASTOLIC BLOOD PRESSURE: 80 MMHG | HEART RATE: 74 BPM | HEIGHT: 65 IN | WEIGHT: 127 LBS | BODY MASS INDEX: 21.16 KG/M2 | OXYGEN SATURATION: 98 %

## 2025-05-13 DIAGNOSIS — M54.50 LOW BACK PAIN, UNSPECIFIED BACK PAIN LATERALITY, UNSPECIFIED CHRONICITY, UNSPECIFIED WHETHER SCIATICA PRESENT: ICD-10-CM

## 2025-05-13 DIAGNOSIS — Z12.4 CERVICAL CANCER SCREENING: ICD-10-CM

## 2025-05-13 DIAGNOSIS — R35.0 URINARY FREQUENCY: ICD-10-CM

## 2025-05-13 DIAGNOSIS — R10.2 PELVIC PAIN: Primary | ICD-10-CM

## 2025-05-13 LAB
BILIRUB BLD-MCNC: NEGATIVE MG/DL
CLARITY, POC: CLEAR
COLOR UR: YELLOW
EXPIRATION DATE: NORMAL
GLUCOSE UR STRIP-MCNC: NEGATIVE MG/DL
KETONES UR QL: NEGATIVE
LEUKOCYTE EST, POC: NEGATIVE
Lab: NORMAL
NITRITE UR-MCNC: NEGATIVE MG/ML
PH UR: 7 [PH] (ref 5–8)
PROT UR STRIP-MCNC: NEGATIVE MG/DL
RBC # UR STRIP: NEGATIVE /UL
SP GR UR: 1.01 (ref 1–1.03)
UROBILINOGEN UR QL: NORMAL

## 2025-05-13 PROCEDURE — 99213 OFFICE O/P EST LOW 20 MIN: CPT | Performed by: INTERNAL MEDICINE

## 2025-05-13 PROCEDURE — 81003 URINALYSIS AUTO W/O SCOPE: CPT | Performed by: INTERNAL MEDICINE

## 2025-05-13 NOTE — PROGRESS NOTES
Subjective     Terri Grant is a 65 y.o. female who presents with No chief complaint on file.      History of Present Illness     Symptoms have improved but pelvic pain and low back pain have continued.  Urgency and frequency associated.  No dysuria.  Urine culture was negative.      Review of Systems   Respiratory: Negative.     Cardiovascular: Negative.    Gastrointestinal:  Positive for abdominal pain. Negative for abdominal distention, anal bleeding, blood in stool, constipation, diarrhea, nausea, rectal pain and vomiting.   Genitourinary:  Positive for frequency and urgency. Negative for dysuria, hematuria, vaginal bleeding, vaginal discharge and vaginal pain.       The following portions of the patient's history were reviewed and updated as appropriate: allergies, current medications and problem list.    Patient Active Problem List    Diagnosis Date Noted    Hx of colonic polyp 01/21/2022     Note Last Updated: 1/21/2022     Added automatically from request for surgery [id number]      Chronic scapular pain 05/09/2018     Note Last Updated: 5/9/2018     Acute episode      Neck pain on left side 05/09/2018    Suprascapular entrapment neuropathy of left side 03/22/2016       Current Outpatient Medications on File Prior to Visit   Medication Sig Dispense Refill    acetaminophen (TYLENOL) 500 MG tablet Take 2 tablets by mouth As Needed for Mild Pain.      azithromycin (Zithromax Z-Aravind) 250 MG tablet Take 2 tablets the first day, then 1 tablet daily for 4 days. 6 tablet 0    benzonatate (TESSALON) 200 MG capsule Take 1 capsule by mouth 3 (Three) Times a Day As Needed for Cough. 30 capsule 2    cholecalciferol (VITAMIN D3) 10 MCG (400 UNIT) tablet Take 1 tablet by mouth Daily.      COLLAGEN PO Take  by mouth. 1 scoop collagen peptide powder once a day      cyclobenzaprine (FLEXERIL) 10 MG tablet Take 1 tablet by mouth 2 (Two) Times a Day As Needed. (uses twice a week)      escitalopram (LEXAPRO) 5 MG tablet Take  "1 tablet by mouth Daily.      fexofenadine (ALLEGRA) 60 MG tablet Take 1 tablet by mouth Daily.      HYDROcodone-acetaminophen (NORCO) 7.5-325 MG per tablet Take 1-2 tablets by mouth Every 4 (Four) Hours As Needed for Moderate Pain  (Pain). 60 tablet 0    ipratropium (ATROVENT) 0.06 % nasal spray Administer 2 sprays into the nostril(s) as directed by provider 4 (Four) Times a Day. 15 mL 2    Multiple Vitamins-Minerals (AIRBORNE GUMMIES) chewable tablet Chew 1 tablet Daily.      nitrofurantoin, macrocrystal-monohydrate, (Macrobid) 100 MG capsule Take 1 capsule by mouth 2 (Two) Times a Day for 7 days. 14 capsule 0    pantoprazole (PROTONIX) 40 MG EC tablet Take 1 tablet by mouth Daily.      saline 0.65 % nasal solution (BABY AYR) 0.65 % solution Administer 0.1 sprays into the nostril(s) as directed by provider As Needed (congestion). 30 mL 2    vitamin B-12 (CYANOCOBALAMIN) 500 MCG tablet Take 1 tablet by mouth Daily.      vitamin C (ASCORBIC ACID) 500 MG tablet Take 1 tablet by mouth Daily.      Zinc 30 MG capsule Take  by mouth.       No current facility-administered medications on file prior to visit.       Objective     /80   Pulse 74   Ht 165.1 cm (65\")   Wt 57.6 kg (127 lb)   LMP  (LMP Unknown)   SpO2 98%   BMI 21.13 kg/m²     Physical Exam  Constitutional:       Appearance: She is well-developed.   HENT:      Head: Normocephalic and atraumatic.   Pulmonary:      Effort: Pulmonary effort is normal.   Abdominal:      General: There is no distension.      Palpations: Abdomen is soft. There is no mass.      Tenderness: There is abdominal tenderness in the right lower quadrant and left lower quadrant. There is no guarding or rebound.   Genitourinary:     Labia:         Right: No lesion.         Left: No lesion.       Vagina: Normal.      Cervix: No cervical motion tenderness, discharge or friability.      Adnexa:         Right: No mass or tenderness.          Left: No mass or tenderness.   "   Neurological:      Mental Status: She is alert.         Assessment & Plan   Diagnoses and all orders for this visit:    1. Pelvic pain (Primary)  -     CT Abdomen Pelvis With Contrast; Future    2. Urinary frequency  -     POC Urinalysis Dipstick, Automated  -     Cancel: Urine Culture - Urine, Urine, Clean Catch    3. Cervical cancer screening  -     IGP, Aptima HPV, Rfx 16 / 18,45    4. Low back pain, unspecified back pain laterality, unspecified chronicity, unspecified whether sciatica present        Discussion    Patient presents with ongoing pelvic pain, low back pain, urinary frequency but negative urine culture.  She is improved on antibiotics.  GYN exam is unremarkable.  Pap smear is performed.  Further evaluate symptoms with a CT of the abdomen and pelvis.         Future Appointments   Date Time Provider Department Center   6/24/2025  3:00 PM Alberto Armijo MD [facility]

## 2025-05-16 LAB
CYTOLOGIST CVX/VAG CYTO: NORMAL
CYTOLOGY CVX/VAG DOC CYTO: NORMAL
CYTOLOGY CVX/VAG DOC THIN PREP: NORMAL
DX ICD CODE: NORMAL
HPV GENOTYPE REFLEX: NORMAL
HPV I/H RISK 4 DNA CVX QL PROBE+SIG AMP: NEGATIVE
OTHER STN SPEC: NORMAL
SERVICE CMNT-IMP: NORMAL
STAT OF ADQ CVX/VAG CYTO-IMP: NORMAL

## 2025-06-24 ENCOUNTER — OFFICE VISIT (OUTPATIENT)
Dept: INTERNAL MEDICINE | Facility: CLINIC | Age: 65
End: 2025-06-24
Payer: MEDICARE

## 2025-06-24 VITALS
SYSTOLIC BLOOD PRESSURE: 122 MMHG | WEIGHT: 126.4 LBS | DIASTOLIC BLOOD PRESSURE: 70 MMHG | HEIGHT: 65 IN | OXYGEN SATURATION: 99 % | HEART RATE: 72 BPM | BODY MASS INDEX: 21.06 KG/M2

## 2025-06-24 DIAGNOSIS — R03.0 ELEVATED BLOOD PRESSURE READING WITHOUT DIAGNOSIS OF HYPERTENSION: Primary | ICD-10-CM

## 2025-06-24 DIAGNOSIS — R10.2 PELVIC PAIN: ICD-10-CM

## 2025-06-24 DIAGNOSIS — M79.641 PAIN IN BOTH HANDS: ICD-10-CM

## 2025-06-24 DIAGNOSIS — Z86.59 HISTORY OF DEPRESSION: ICD-10-CM

## 2025-06-24 DIAGNOSIS — M79.642 PAIN IN BOTH HANDS: ICD-10-CM

## 2025-06-24 PROCEDURE — 1126F AMNT PAIN NOTED NONE PRSNT: CPT | Performed by: STUDENT IN AN ORGANIZED HEALTH CARE EDUCATION/TRAINING PROGRAM

## 2025-06-24 PROCEDURE — 99214 OFFICE O/P EST MOD 30 MIN: CPT | Performed by: STUDENT IN AN ORGANIZED HEALTH CARE EDUCATION/TRAINING PROGRAM

## 2025-06-24 NOTE — PROGRESS NOTES
Alberto Armijo M.D.  Internal Medicine  [facility]  [address], [address]  [place] [address]  [phone]      Chief Complaint  No chief complaint on file.    SUBJECTIVE      Terri Grant is a 65 y.o. female with past medical history significant for depression, hyperlipidemia, GERD who presents to the office today as an established patient that last saw me on 5/8/2025.     History of Present Illness    She has been monitoring her blood pressure using a white cuff, which has consistently shown readings in the 120s. She attributes this improvement to the change in cuff from a black one, which previously indicated a reading of 150. She does not have an arm cuff for comparison. She is not on any antihypertensive medication.    She has discontinued Lexapro after a year of use, reducing the dosage from 10 mg to 5 mg over a month before stopping completely at the end of May. Since discontinuing Lexapro, she has noticed increased forgetfulness, poor sleep quality, and exacerbated hot flashes. However, she reports no feelings of depression. She maintains a positive outlook, stays active, and follows a healthy diet.    She is experiencing a flare-up of osteoarthritis in her knuckles, causing significant pain and difficulty in bending her fingers. She has an upcoming appointment with Dr. Lopez on 08/04/2025, who may suggest cortisone injections or bone spur removal, but she is reluctant to undergo another hand surgery. She has been managing the pain with an ice glove and over-the-counter anti-inflammatory medications such as Motrin or Aleve, taken daily. She has also been monitoring her salt intake and incorporating anti-inflammatory foods into her diet. She has previously undergone hand therapy post-surgery and is considering resuming it.    She has been experiencing knee pain, which she attributes to increased walking. The pain is localized to the left side and is believed to be due to  arthritis following surgery. She finds relief in wearing a brace while mowing the lawn and applying ice once a week. She has previously undergone knee therapy twice and shoulder therapy for several months, which has resulted in less frequent flare-ups and reduced pain in her shoulder compared to her hands and knee. She takes hydrocodone and cyclobenzaprine as needed for pain management, typically one pill per week, and sees her pain management specialist every six months.    She had pelvic pain and saw Dr. Read. A urine sample was taken and it was very slight. She called back and said she was still having pelvic pain. She came in and Dr. Read said she was fine. She did a Pap smear and it went away on its own. She has not had any problems since then. Dr. Read had ordered a CT scan to evaluate more if the pain did not go away.    She has a scheduled bone density test on 07/10/2025.    Review of Systems   Constitutional:  Negative for malaise/fatigue.   Eyes:  Negative for blurred vision.   Respiratory:  Negative for shortness of breath.    Cardiovascular:  Negative for chest pain, palpitations and orthopnea.   Neurological:  Negative for headaches.     Allergies   Allergen Reactions    Augmentin [Amoxicillin-Pot Clavulanate] Nausea And Vomiting        No outpatient medications have been marked as taking for the 6/24/25 encounter (Appointment) with Alberto Armijo MD.        Past Medical History:   Diagnosis Date    Abdominal pain, epigastric     Acid reflux     Arthritis     osteoarthritis gets cortisone injections yearly    Bone spur     Bronchitis     Cervical disc disorder     Colon polyps     Environmental and seasonal allergies     Nausea     Neck pain     Pancreatitis     Seasonal allergies     Suprascapular entrapment neuropathy of left side      Past Surgical History:   Procedure Laterality Date    BUNIONECTOMY Right     CHOLECYSTECTOMY      COLONOSCOPY  3 YEARS AGO    COLONOSCOPY N/A 03/09/2016     Procedure: COLONOSCOPYwith hot polypectomy times 1, cold biopsy polypectomy times 3;  Surgeon: Betty Lopez MD;  Location: [facility] ENDOSCOPY;  Service:     COLONOSCOPY N/A 05/03/2022    Procedure: COLONOSCOPY INTO CECUM & TERMINAL ILEUM WITH COLD BIOPSY POLYPECTOMY;  Surgeon: Betty Lopez MD;  Location: [facility] ENDOSCOPY;  Service: Gastroenterology;  Laterality: N/A;  PRE: PERSONAL H/O COLON POLYPS  POST: COLON POLYP, DIVERTICULOSIS, HEMORRHOIDS    ELBOW COLLATERAL LIGAMENT RECONSTRUCTION      EXCISION BENIGN SKIN LESION SCALP / NECK / HANDS / FEET / GENITALIA      GUM SURGERY      HX OVARIAN CYSTECTOMY      KNEE CARTILAGE SURGERY Right     Meniscus tear    MOUTH SURGERY      OTHER SURGICAL HISTORY      destruction of benign lesion    ULNAR NERVE TRANSPOSITION N/A 05/29/2019    Procedure: LEFT SUPRASCAPULAR NERVE REBUILD;  Surgeon: Shane Ziegler MD;  Location: [facility] MAIN OR;  Service: Neurosurgery     Family History   Problem Relation Age of Onset    Stroke Mother     Heart disease Mother     COPD Mother     Kidney disease Mother     Hypertension Mother     Emphysema Mother     Diabetes Father     Heart disease Father         Cardiomyopathy    Depression Sister     Breast cancer Maternal Aunt     Diabetes Other         Unspecified Aunt    Cancer Other         Unspecified Aunt    Heart disease Other         Unspecified Aunt, Grandmother and Grandfather    Malig Hyperthermia Neg Hx     reports that she has never smoked. She has never been exposed to tobacco smoke. She has never used smokeless tobacco. She reports current alcohol use of about 3.0 standard drinks of alcohol per week. She reports that she does not use drugs.    OBJECTIVE    Vital Signs:   There were no vitals taken for this visit.    Physical Exam  Constitutional:       Appearance: Normal appearance.   Cardiovascular:      Rate and Rhythm: Normal rate and regular rhythm.      Heart sounds: Normal heart sounds. No murmur  heard.  Pulmonary:      Effort: Pulmonary effort is normal.      Breath sounds: Normal breath sounds.   Abdominal:      General: Abdomen is flat. There is no distension.      Palpations: Abdomen is soft.      Tenderness: There is no abdominal tenderness.   Musculoskeletal:      Comments: Joints are slightly swollen in hands   Skin:     General: Skin is warm and dry.   Neurological:      Mental Status: She is alert.   Psychiatric:         Mood and Affect: Mood normal.         Behavior: Behavior normal.         Thought Content: Thought content normal.          Physical Exam      The following data was reviewed by: Alberto Armijo MD on 06/24/2025:  CMP          5/2/2025    08:35   CMP   Glucose 87    BUN 18    Creatinine 0.79    EGFR 83    Sodium 144    Potassium 4.4    Chloride 103    Calcium 9.8    Total Protein 7.2    Albumin 4.9    Globulin 2.3    Total Bilirubin 0.6    Alkaline Phosphatase 85    AST (SGOT) 24    ALT (SGPT) 19    BUN/Creatinine Ratio 23      CBC w/diff          5/2/2025    08:35   CBC w/Diff   WBC 5.0    RBC 4.59    Hemoglobin 14.0    Hematocrit 42.8    MCV 93    MCH 30.5    MCHC 32.7    RDW 12.0    Platelets 216    Neutrophil Rel % 58    Lymphocyte Rel % 29    Monocyte Rel % 9    Eosinophil Rel % 3    Basophil Rel % 1      Lipid Panel          5/2/2025    08:35   Lipid Panel   Total Cholesterol 229    Triglycerides 130    HDL Cholesterol 60    VLDL Cholesterol 23    LDL Cholesterol  146      TSH          5/2/2025    08:35   TSH   TSH 2.340      A1C Last 3 Results          5/2/2025    08:35   HGBA1C Last 3 Results   Hemoglobin A1C 5.2                  ASSESSMENT & PLAN        Elevated blood pressure reading without diagnosis of hypertension  Hypertension is stable and controlled  Continue current treatment regimen.  Blood pressure will be reassessed in 6 months.  Seen in office last month with /80       Pelvic pain  She was seen in our office May 13 for ongoing pelvic pain, low back pain,  urinary frequency but negative urine culture.  She is improved on antibiotics.  GYN exam is unremarkable.  Pap smear is performed and was negative for intraepithelial lesion or malignancy.  She had cellular changes associated with atrophy..  Further evaluate symptoms with a CT of the abdomen and pelvis was considered.  Urine culture showed no growth  Pain resolved on its own with AZO.          History of depression  Weaned off lexapro  Denies depression           Pain in both hands  Follows with Dr. Lopez  Joints are swelling  Ice helps  Take Motrin daily  Also arthritis in knee  Considering OT for hand         Assessment & Plan        Health Maintenance Due   Topic Date Due    ANNUAL WELLNESS VISIT  Never done    DXA SCAN  05/11/2020    COVID-19 Vaccine (8 - 2024-25 season) 04/11/2025        Follow Up  No follow-ups on file.    Patient/family had no further questions at this time and verbalized understanding of the plan discussed today.     Patient or patient representative verbalized consent for the use of Ambient Listening during the visit with  Alberto Armijo MD for chart documentation. 6/24/2025  15:07 EDT

## 2025-07-10 ENCOUNTER — HOSPITAL ENCOUNTER (OUTPATIENT)
Dept: BONE DENSITY | Facility: HOSPITAL | Age: 65
Discharge: HOME | End: 2025-07-10
Admitting: STUDENT IN AN ORGANIZED HEALTH CARE EDUCATION/TRAINING PROGRAM
Payer: MEDICARE

## 2025-07-10 PROCEDURE — 77080 DXA BONE DENSITY AXIAL: CPT

## 2025-08-01 ENCOUNTER — TELEPHONE (OUTPATIENT)
Dept: INTERNAL MEDICINE | Facility: CLINIC | Age: 65
End: 2025-08-01
Payer: MEDICARE

## 2025-08-14 ENCOUNTER — OFFICE VISIT (OUTPATIENT)
Dept: INTERNAL MEDICINE | Facility: CLINIC | Age: 65
End: 2025-08-14
Payer: MEDICARE

## 2025-08-14 VITALS
SYSTOLIC BLOOD PRESSURE: 124 MMHG | DIASTOLIC BLOOD PRESSURE: 68 MMHG | HEART RATE: 70 BPM | RESPIRATION RATE: 16 BRPM | HEIGHT: 65 IN | BODY MASS INDEX: 20.66 KG/M2 | OXYGEN SATURATION: 97 % | WEIGHT: 124 LBS

## 2025-08-14 DIAGNOSIS — H92.02 LEFT EAR PAIN: ICD-10-CM

## 2025-08-14 DIAGNOSIS — H60.502 ACUTE OTITIS EXTERNA OF LEFT EAR, UNSPECIFIED TYPE: Primary | ICD-10-CM

## 2025-08-14 PROCEDURE — 99213 OFFICE O/P EST LOW 20 MIN: CPT | Performed by: NURSE PRACTITIONER

## 2025-08-14 PROCEDURE — 1160F RVW MEDS BY RX/DR IN RCRD: CPT | Performed by: NURSE PRACTITIONER

## 2025-08-14 PROCEDURE — 1159F MED LIST DOCD IN RCRD: CPT | Performed by: NURSE PRACTITIONER

## 2025-08-14 PROCEDURE — 1126F AMNT PAIN NOTED NONE PRSNT: CPT | Performed by: NURSE PRACTITIONER

## 2025-08-14 RX ORDER — NEOMYCIN SULFATE, POLYMYXIN B SULFATE AND HYDROCORTISONE 10; 3.5; 1 MG/ML; MG/ML; [USP'U]/ML
3 SUSPENSION/ DROPS AURICULAR (OTIC) 3 TIMES DAILY
Qty: 10 ML | Refills: 1 | Status: SHIPPED | OUTPATIENT
Start: 2025-08-14 | End: 2025-08-21

## 2025-08-14 RX ORDER — HYDROCODONE BITARTRATE AND ACETAMINOPHEN 7.5; 325 MG/1; MG/1
1 TABLET ORAL
COMMUNITY

## 2025-08-22 ENCOUNTER — HOSPITAL ENCOUNTER (EMERGENCY)
Facility: HOSPITAL | Age: 65
Discharge: HOME | End: 2025-08-22
Attending: EMERGENCY MEDICINE
Payer: MEDICARE

## (undated) DEVICE — DRSNG TELFA PAD NONADH STR 1S 3X4IN

## (undated) DEVICE — DISPOSABLE BIPOLAR CABLE 12FT. (3.6M): Brand: KIRWAN

## (undated) DEVICE — PK ORTHO MINOR 40

## (undated) DEVICE — TUBING, SUCTION, 1/4" X 10', STRAIGHT: Brand: MEDLINE

## (undated) DEVICE — GLV SURG SENSICARE SLT PF LF 8 STRL

## (undated) DEVICE — ADAPT CLN BIOGUARD AIR/H2O DISP

## (undated) DEVICE — 3M™ IOBAN™ 2 ANTIMICROBIAL INCISE DRAPE 6640EZ: Brand: IOBAN™ 2

## (undated) DEVICE — SINGLE-USE BIOPSY FORCEPS: Brand: RADIAL JAW 4

## (undated) DEVICE — BNDG ELAS ELITE V/CLOSE 4IN 5YD LF STRL

## (undated) DEVICE — ELECTRD BLD EDGE/INSUL1P 2.4X5.1MM STRL

## (undated) DEVICE — BANDAGE,GAUZE,BULKEE II,4.5"X4.1YD,STRL: Brand: MEDLINE

## (undated) DEVICE — GLV SURG SENSICARE SLT PF LF 8.5 STRL

## (undated) DEVICE — SPNG GZ WOVN 4X4IN 12PLY 10/BX STRL

## (undated) DEVICE — OCCLUSIVE GAUZE STRIP,3% BISMUTH TRIBROMOPHENATE IN PETROLATUM BLEND: Brand: XEROFORM

## (undated) DEVICE — DRSNG WND BORDR/ADHS NONADHR/GZ LF 4X4IN STRL

## (undated) DEVICE — STCKNT IMPERV 9X36IN STRL

## (undated) DEVICE — SENSR O2 OXIMAX FNGR A/ 18IN NONSTR

## (undated) DEVICE — NDL HYPO PRECISIONGLIDE REG 25G 1 1/2

## (undated) DEVICE — SOL ISO/ALC RUB 70PCT 4OZ

## (undated) DEVICE — DRSNG WND GEL FIBR OPTICELL AG PLS W/SLV LF 4X5IN  STRL

## (undated) DEVICE — CANN O2 ETCO2 FITS ALL CONN CO2 SMPL A/ 7IN DISP LF

## (undated) DEVICE — SUT VIC 0 CT2 CR8 18IN DYED J727D

## (undated) DEVICE — BNDG ELAS CO-FLEX SLF ADHR 4IN5YD LF STRL

## (undated) DEVICE — LN SMPL CO2 SHTRM SD STREAM W/M LUER

## (undated) DEVICE — ANTIBACTERIAL UNDYED BRAIDED (POLYGLACTIN 910), SYNTHETIC ABSORBABLE SUTURE: Brand: COATED VICRYL

## (undated) DEVICE — KT ORCA ORCAPOD DISP STRL

## (undated) DEVICE — APPL CHLORAPREP W/TINT 26ML ORNG